# Patient Record
Sex: FEMALE | Race: WHITE | NOT HISPANIC OR LATINO | Employment: OTHER | URBAN - METROPOLITAN AREA
[De-identification: names, ages, dates, MRNs, and addresses within clinical notes are randomized per-mention and may not be internally consistent; named-entity substitution may affect disease eponyms.]

---

## 2017-05-23 ENCOUNTER — COMMUNICATION - HEALTHEAST (OUTPATIENT)
Dept: FAMILY MEDICINE | Facility: CLINIC | Age: 57
End: 2017-05-23

## 2017-05-23 DIAGNOSIS — F32.A DEPRESSION: ICD-10-CM

## 2017-05-25 ENCOUNTER — COMMUNICATION - HEALTHEAST (OUTPATIENT)
Dept: FAMILY MEDICINE | Facility: CLINIC | Age: 57
End: 2017-05-25

## 2017-07-25 ENCOUNTER — COMMUNICATION - HEALTHEAST (OUTPATIENT)
Dept: FAMILY MEDICINE | Facility: CLINIC | Age: 57
End: 2017-07-25

## 2017-07-25 DIAGNOSIS — F32.A DEPRESSION: ICD-10-CM

## 2017-07-30 ENCOUNTER — COMMUNICATION - HEALTHEAST (OUTPATIENT)
Dept: FAMILY MEDICINE | Facility: CLINIC | Age: 57
End: 2017-07-30

## 2017-07-30 DIAGNOSIS — F32.A DEPRESSION: ICD-10-CM

## 2017-07-31 ENCOUNTER — COMMUNICATION - HEALTHEAST (OUTPATIENT)
Dept: FAMILY MEDICINE | Facility: CLINIC | Age: 57
End: 2017-07-31

## 2017-08-07 ENCOUNTER — OFFICE VISIT - HEALTHEAST (OUTPATIENT)
Dept: FAMILY MEDICINE | Facility: CLINIC | Age: 57
End: 2017-08-07

## 2017-08-07 DIAGNOSIS — J32.9 SINUSITIS, CHRONIC: ICD-10-CM

## 2017-08-07 DIAGNOSIS — E66.3 OVERWEIGHT: ICD-10-CM

## 2017-08-07 DIAGNOSIS — Z00.00 HEALTH CARE MAINTENANCE: ICD-10-CM

## 2017-08-07 DIAGNOSIS — F33.1 MAJOR DEPRESSIVE DISORDER, RECURRENT EPISODE, MODERATE (H): ICD-10-CM

## 2017-08-07 DIAGNOSIS — R53.83 FATIGUE: ICD-10-CM

## 2017-08-07 LAB
CHOLEST SERPL-MCNC: 240 MG/DL
FASTING STATUS PATIENT QL REPORTED: NO
HBA1C MFR BLD: 5.9 % (ref 3.5–6)
HDLC SERPL-MCNC: 64 MG/DL
LDLC SERPL CALC-MCNC: 151 MG/DL
TRIGL SERPL-MCNC: 125 MG/DL

## 2017-08-08 ENCOUNTER — COMMUNICATION - HEALTHEAST (OUTPATIENT)
Dept: FAMILY MEDICINE | Facility: CLINIC | Age: 57
End: 2017-08-08

## 2017-08-09 ENCOUNTER — COMMUNICATION - HEALTHEAST (OUTPATIENT)
Dept: FAMILY MEDICINE | Facility: CLINIC | Age: 57
End: 2017-08-09

## 2017-08-21 ENCOUNTER — COMMUNICATION - HEALTHEAST (OUTPATIENT)
Dept: FAMILY MEDICINE | Facility: CLINIC | Age: 57
End: 2017-08-21

## 2017-10-18 ENCOUNTER — COMMUNICATION - HEALTHEAST (OUTPATIENT)
Dept: FAMILY MEDICINE | Facility: CLINIC | Age: 57
End: 2017-10-18

## 2017-10-18 DIAGNOSIS — J32.9 SINUSITIS, CHRONIC: ICD-10-CM

## 2018-03-09 ENCOUNTER — RADIANT APPOINTMENT (OUTPATIENT)
Dept: GENERAL RADIOLOGY | Facility: CLINIC | Age: 58
End: 2018-03-09
Attending: INTERNAL MEDICINE
Payer: COMMERCIAL

## 2018-03-09 ENCOUNTER — OFFICE VISIT (OUTPATIENT)
Dept: URGENT CARE | Facility: URGENT CARE | Age: 58
End: 2018-03-09
Payer: COMMERCIAL

## 2018-03-09 VITALS
OXYGEN SATURATION: 97 % | SYSTOLIC BLOOD PRESSURE: 138 MMHG | TEMPERATURE: 98.8 F | DIASTOLIC BLOOD PRESSURE: 88 MMHG | HEART RATE: 107 BPM

## 2018-03-09 DIAGNOSIS — R05.8 POST-VIRAL COUGH SYNDROME: Primary | ICD-10-CM

## 2018-03-09 DIAGNOSIS — R05.3 CHRONIC COUGH: ICD-10-CM

## 2018-03-09 PROCEDURE — 71046 X-RAY EXAM CHEST 2 VIEWS: CPT

## 2018-03-09 PROCEDURE — 99203 OFFICE O/P NEW LOW 30 MIN: CPT | Performed by: INTERNAL MEDICINE

## 2018-03-09 RX ORDER — ALBUTEROL SULFATE 90 UG/1
2 AEROSOL, METERED RESPIRATORY (INHALATION) EVERY 6 HOURS
COMMUNITY
End: 2022-04-12

## 2018-03-09 RX ORDER — METHYLPREDNISOLONE 4 MG
TABLET, DOSE PACK ORAL
Qty: 21 TABLET | Refills: 0 | Status: SHIPPED | OUTPATIENT
Start: 2018-03-09 | End: 2022-10-17

## 2018-03-09 ASSESSMENT — ENCOUNTER SYMPTOMS
DIAPHORESIS: 0
VOMITING: 0
HEARTBURN: 0
CHILLS: 0
MYALGIAS: 0
NAUSEA: 0
FEVER: 0
SORE THROAT: 0
SHORTNESS OF BREATH: 1
WEIGHT LOSS: 0
COUGH: 1
SPUTUM PRODUCTION: 0
HEMOPTYSIS: 0

## 2018-03-09 NOTE — MR AVS SNAPSHOT
"              After Visit Summary   3/9/2018    Romy LEMA    MRN: [id number]           Patient Information     Date Of Birth          1960        Visit Information        Provider Department      3/9/2018 5:00 PM Chandler Walker MD [facility] [place] [facility]        Today's Diagnoses     Post-viral cough syndrome    -  1    Chronic cough          Care Instructions    Follow up with your doctor if your symptoms persist/worsens, or if you develop new symptoms or side effects from the medication.            Follow-ups after your visit        Who to contact     If you have questions or need follow up information about today's clinic visit or your schedule please contact [facility] directly at [phone].  Normal or non-critical lab and imaging results will be communicated to you by [facility], letter or phone within 4 business days after the clinic has received the results. If you do not hear from us within 7 days, please contact the clinic through MyChart or phone. If you have a critical or abnormal lab result, we will notify you by phone as soon as possible.  Submit refill requests through [employer] or call your pharmacy and they will forward the refill request to us. Please allow 3 business days for your refill to be completed.          Additional Information About Your Visit        MyChart Information     [employer] lets you send messages to your doctor, view your test results, renew your prescriptions, schedule appointments and more. To sign up, go to [email or web address] . Click on \"Log in\" on the left side of the screen, which will take you to the Welcome page. Then click on \"Sign up Now\" on the right side of the page.     You will be asked to enter the access code listed below, as well as some personal information. Please follow the directions to create your username and password.     Your access code is: [email or web address]  Expires: 6/7/2018  6:13 PM     Your " access code will  in 90 days. If you need help or a new code, please call your [place] clinic or [phone].        Care EveryWhere ID     This is your Care EveryWhere ID. This could be used by other organizations to access your [place] medical records  [id number]        Your Vitals Were     Pulse Temperature Pulse Oximetry Breastfeeding?          107 98.8  F (37.1  C) (Tympanic) 97% No         Blood Pressure from Last 3 Encounters:   18 138/88    Weight from Last 3 Encounters:   No data found for Wt                 Today's Medication Changes          These changes are accurate as of 3/9/18  6:13 PM.  If you have any questions, ask your nurse or doctor.               Start taking these medicines.        Dose/Directions    methylPREDNISolone 4 MG tablet   Commonly known as:  MEDROL DOSEPAK   Used for:  Post-viral cough syndrome   Started by:  Chandler Walker MD        Follow package instructions   Quantity:  21 tablet   Refills:  0            Where to get your medicines      These medications were sent to [place] [address] [place] [facility] [address]  [address] [facility] [address]     Phone:  [phone]     methylPREDNISolone 4 MG tablet                Primary Care Provider Fax #    Provider Not In System [phone]                Equal Access to Services     STEPHANIE FORD AH: Hadii ghanshyam ku hadasho Soomaali, waaxda luqadaha, qaybta kaalmada adeegyada, brigido tobar hayveronica lopes. So [facility] [phone].    ATENCIÓN: Si habla español, tiene a simon disposición servicios gratuitos de asistencia lingüística. Llame al [phone].    We comply with applicable federal civil rights laws and [place] laws. We do not discriminate on the basis of race, color, national origin, age, disability, sex, sexual orientation, or gender identity.            Thank you!     Thank you for choosing [facility]  for your care. Our goal is always to provide you with  excellent care. Hearing back from our patients is one way we can continue to improve our services. Please take a few minutes to complete the written survey that you may receive in the mail after your visit with us. Thank you!             Your Updated Medication List - Protect others around you: Learn how to safely use, store and throw away your medicines at www.disposemymeds.org.          This list is accurate as of 3/9/18  6:13 PM.  Always use your most recent med list.                   Brand Name Dispense Instructions for use Diagnosis    albuterol 108 (90 BASE) MCG/ACT Inhaler    PROAIR HFA/PROVENTIL HFA/VENTOLIN HFA     Inhale 2 puffs into the lungs every 6 hours        fluticasone 27.5 MCG/SPRAY spray    VERAMYST     Spray 2 sprays into both nostrils daily        methylPREDNISolone 4 MG tablet    MEDROL DOSEPAK    21 tablet    Follow package instructions    Post-viral cough syndrome       WELLBUTRIN PO

## 2018-03-09 NOTE — NURSING NOTE
Chief Complaint   Patient presents with     Urgent Care     Cough     Patient was seen at [facility] last Friday and they referred her to come here for a chest x-ray.        Initial /88 (BP Location: Right arm, Patient Position: Sitting, Cuff Size: Adult Large)  Pulse 107  Temp 98.8  F (37.1  C) (Tympanic)  SpO2 97%  Breastfeeding? No There is no height or weight on file to calculate BMI.  Medication Reconciliation: complete.  ABHI Stoll

## 2018-03-10 NOTE — PROGRESS NOTES
HPI    During Thanksgiving season of last year, the patient developed fever and URI symptoms which eventually resolved after 1-2 weeks.  Thereafter, the patient continued to have episodic nonproductive cough with occasional shortness of breath. No recurrence of fever or chills. Was seen at a [facility] clinic and was given a prescription for Augmentin; however, her cough continued to persist -- prompting today's consult.      Past medical history: Denies history of asthma      Review of Systems   Constitutional: Negative for chills, diaphoresis, fever, malaise/fatigue and weight loss.   HENT: Negative for congestion and sore throat.    Respiratory: Positive for cough and shortness of breath (occasional). Negative for hemoptysis and sputum production.    Cardiovascular: Positive for chest pain (only post-tussive).   Gastrointestinal: Negative for heartburn, nausea and vomiting.   Musculoskeletal: Negative for myalgias.   Skin: Negative for rash.       /88 (BP Location: Right arm, Patient Position: Sitting, Cuff Size: Adult Large)  Pulse 107  Temp 98.8  F (37.1  C) (Tympanic)  SpO2 97%  Breastfeeding? No      Physical Exam   Constitutional: She is oriented to person, place, and time. No distress.   Cardiovascular: Normal rate, regular rhythm and normal heart sounds.    Pulmonary/Chest: Effort normal and breath sounds normal. No respiratory distress.   Lymphadenopathy:     She has no cervical adenopathy.   Neurological: She is alert and oriented to person, place, and time. GCS score is 15.   Vitals reviewed.        ICD-10-CM    1. Post-viral cough syndrome R05 methylPREDNISolone (MEDROL DOSEPAK) 4 MG tablet   2. Chronic cough R05 XR Chest 2 Views     **please refer to HPI for status of conditions      Patient Instructions   Follow up with your doctor if your symptoms persist/worsens, or if you develop new symptoms or side effects from the medication.

## 2018-03-21 ENCOUNTER — OFFICE VISIT - HEALTHEAST (OUTPATIENT)
Dept: FAMILY MEDICINE | Facility: CLINIC | Age: 58
End: 2018-03-21

## 2018-03-21 DIAGNOSIS — H65.03 BILATERAL ACUTE SEROUS OTITIS MEDIA, RECURRENCE NOT SPECIFIED: ICD-10-CM

## 2018-03-21 DIAGNOSIS — J40 BRONCHITIS: ICD-10-CM

## 2018-03-21 DIAGNOSIS — R05.9 COUGH: ICD-10-CM

## 2018-05-10 ENCOUNTER — OFFICE VISIT - HEALTHEAST (OUTPATIENT)
Dept: FAMILY MEDICINE | Facility: CLINIC | Age: 58
End: 2018-05-10

## 2018-05-10 DIAGNOSIS — M79.10 MYALGIA: ICD-10-CM

## 2018-05-10 DIAGNOSIS — R05.9 COUGH: ICD-10-CM

## 2018-05-10 DIAGNOSIS — R06.2 WHEEZING: ICD-10-CM

## 2018-05-10 LAB
FLUAV AG SPEC QL IA: NORMAL
FLUBV AG SPEC QL IA: NORMAL

## 2018-05-22 ENCOUNTER — COMMUNICATION - HEALTHEAST (OUTPATIENT)
Dept: SCHEDULING | Facility: CLINIC | Age: 58
End: 2018-05-22

## 2018-07-20 ENCOUNTER — COMMUNICATION - HEALTHEAST (OUTPATIENT)
Dept: FAMILY MEDICINE | Facility: CLINIC | Age: 58
End: 2018-07-20

## 2018-07-20 DIAGNOSIS — F33.1 MAJOR DEPRESSIVE DISORDER, RECURRENT EPISODE, MODERATE (H): ICD-10-CM

## 2018-08-02 ENCOUNTER — COMMUNICATION - HEALTHEAST (OUTPATIENT)
Dept: FAMILY MEDICINE | Facility: CLINIC | Age: 58
End: 2018-08-02

## 2018-08-02 DIAGNOSIS — R05.3 CHRONIC COUGH: ICD-10-CM

## 2018-08-02 DIAGNOSIS — R06.2 WHEEZE: ICD-10-CM

## 2018-08-02 DIAGNOSIS — R06.2 WHEEZING: ICD-10-CM

## 2018-08-06 ENCOUNTER — AMBULATORY - HEALTHEAST (OUTPATIENT)
Dept: PULMONOLOGY | Facility: OTHER | Age: 58
End: 2018-08-06

## 2018-08-06 DIAGNOSIS — R05.3 CHRONIC COUGH: ICD-10-CM

## 2018-08-06 DIAGNOSIS — R06.2 WHEEZE: ICD-10-CM

## 2018-08-09 ENCOUNTER — COMMUNICATION - HEALTHEAST (OUTPATIENT)
Dept: PULMONOLOGY | Facility: OTHER | Age: 58
End: 2018-08-09

## 2018-08-30 ENCOUNTER — OFFICE VISIT - HEALTHEAST (OUTPATIENT)
Dept: PULMONOLOGY | Facility: OTHER | Age: 58
End: 2018-08-30

## 2018-08-30 ENCOUNTER — HOSPITAL ENCOUNTER (OUTPATIENT)
Dept: RESPIRATORY THERAPY | Facility: HOSPITAL | Age: 58
Discharge: HOME | End: 2018-08-30
Attending: INTERNAL MEDICINE

## 2018-08-30 DIAGNOSIS — R05.3 CHRONIC COUGH: ICD-10-CM

## 2018-08-30 DIAGNOSIS — R06.2 WHEEZE: ICD-10-CM

## 2018-08-30 DIAGNOSIS — J44.89 CHRONIC OBSTRUCTIVE AIRWAY DISEASE WITH ASTHMA (H): ICD-10-CM

## 2018-08-30 DIAGNOSIS — R06.2 WHEEZING: ICD-10-CM

## 2018-08-30 LAB — HGB BLD-MCNC: 15.6 G/DL (ref 12–16)

## 2018-09-04 LAB — A FUMIGATUS IGE QN: <0.35 KU/L

## 2018-10-24 ENCOUNTER — OFFICE VISIT - HEALTHEAST (OUTPATIENT)
Dept: PULMONOLOGY | Facility: OTHER | Age: 58
End: 2018-10-24

## 2018-10-24 DIAGNOSIS — J45.41 MODERATE PERSISTENT ASTHMA WITH EXACERBATION: ICD-10-CM

## 2018-10-26 ENCOUNTER — COMMUNICATION - HEALTHEAST (OUTPATIENT)
Dept: PULMONOLOGY | Facility: OTHER | Age: 58
End: 2018-10-26

## 2018-10-26 DIAGNOSIS — R06.2 WHEEZING: ICD-10-CM

## 2019-04-16 ENCOUNTER — COMMUNICATION - HEALTHEAST (OUTPATIENT)
Dept: FAMILY MEDICINE | Facility: CLINIC | Age: 59
End: 2019-04-16

## 2019-04-16 DIAGNOSIS — F33.1 MAJOR DEPRESSIVE DISORDER, RECURRENT EPISODE, MODERATE (H): ICD-10-CM

## 2019-05-22 ENCOUNTER — OFFICE VISIT - HEALTHEAST (OUTPATIENT)
Dept: FAMILY MEDICINE | Facility: CLINIC | Age: 59
End: 2019-05-22

## 2019-05-22 DIAGNOSIS — Z12.31 VISIT FOR SCREENING MAMMOGRAM: ICD-10-CM

## 2019-05-22 DIAGNOSIS — F33.1 MAJOR DEPRESSIVE DISORDER, RECURRENT EPISODE, MODERATE (H): ICD-10-CM

## 2019-05-22 DIAGNOSIS — J44.89 CHRONIC OBSTRUCTIVE AIRWAY DISEASE WITH ASTHMA (H): ICD-10-CM

## 2019-05-22 ASSESSMENT — MIFFLIN-ST. JEOR: SCORE: 1543.41

## 2019-06-06 ENCOUNTER — COMMUNICATION - HEALTHEAST (OUTPATIENT)
Dept: FAMILY MEDICINE | Facility: CLINIC | Age: 59
End: 2019-06-06

## 2019-06-06 DIAGNOSIS — J45.30 MILD PERSISTENT ASTHMA WITHOUT COMPLICATION: ICD-10-CM

## 2019-06-07 ENCOUNTER — COMMUNICATION - HEALTHEAST (OUTPATIENT)
Dept: FAMILY MEDICINE | Facility: CLINIC | Age: 59
End: 2019-06-07

## 2019-06-07 DIAGNOSIS — F33.1 MAJOR DEPRESSIVE DISORDER, RECURRENT EPISODE, MODERATE (H): ICD-10-CM

## 2019-10-15 ENCOUNTER — COMMUNICATION - HEALTHEAST (OUTPATIENT)
Dept: PULMONOLOGY | Facility: OTHER | Age: 59
End: 2019-10-15

## 2019-10-15 DIAGNOSIS — J44.89 CHRONIC OBSTRUCTIVE AIRWAY DISEASE WITH ASTHMA (H): ICD-10-CM

## 2019-10-15 DIAGNOSIS — R06.2 WHEEZING: ICD-10-CM

## 2020-02-05 ENCOUNTER — COMMUNICATION - HEALTHEAST (OUTPATIENT)
Dept: FAMILY MEDICINE | Facility: CLINIC | Age: 60
End: 2020-02-05

## 2020-02-05 DIAGNOSIS — J45.30 MILD PERSISTENT ASTHMA WITHOUT COMPLICATION: ICD-10-CM

## 2020-02-05 DIAGNOSIS — R06.2 WHEEZING: ICD-10-CM

## 2020-02-24 ENCOUNTER — COMMUNICATION - HEALTHEAST (OUTPATIENT)
Dept: FAMILY MEDICINE | Facility: CLINIC | Age: 60
End: 2020-02-24

## 2020-02-24 DIAGNOSIS — R06.2 WHEEZING: ICD-10-CM

## 2020-02-28 ENCOUNTER — COMMUNICATION - HEALTHEAST (OUTPATIENT)
Dept: FAMILY MEDICINE | Facility: CLINIC | Age: 60
End: 2020-02-28

## 2020-02-28 DIAGNOSIS — J45.30 MILD PERSISTENT ASTHMA WITHOUT COMPLICATION: ICD-10-CM

## 2020-03-20 ENCOUNTER — COMMUNICATION - HEALTHEAST (OUTPATIENT)
Dept: FAMILY MEDICINE | Facility: CLINIC | Age: 60
End: 2020-03-20

## 2020-03-20 DIAGNOSIS — J45.30 MILD PERSISTENT ASTHMA WITHOUT COMPLICATION: ICD-10-CM

## 2020-03-30 ENCOUNTER — COMMUNICATION - HEALTHEAST (OUTPATIENT)
Dept: PHARMACY | Facility: CLINIC | Age: 60
End: 2020-03-30

## 2020-03-30 DIAGNOSIS — J45.30 MILD PERSISTENT ASTHMA WITHOUT COMPLICATION: ICD-10-CM

## 2020-07-03 ENCOUNTER — COMMUNICATION - HEALTHEAST (OUTPATIENT)
Dept: FAMILY MEDICINE | Facility: CLINIC | Age: 60
End: 2020-07-03

## 2020-07-03 DIAGNOSIS — F33.1 MAJOR DEPRESSIVE DISORDER, RECURRENT EPISODE, MODERATE (H): ICD-10-CM

## 2020-10-09 ENCOUNTER — COMMUNICATION - HEALTHEAST (OUTPATIENT)
Dept: FAMILY MEDICINE | Facility: CLINIC | Age: 60
End: 2020-10-09

## 2020-10-09 DIAGNOSIS — J45.30 MILD PERSISTENT ASTHMA WITHOUT COMPLICATION: ICD-10-CM

## 2020-10-09 DIAGNOSIS — F33.1 MAJOR DEPRESSIVE DISORDER, RECURRENT EPISODE, MODERATE (H): ICD-10-CM

## 2020-10-09 DIAGNOSIS — R06.2 WHEEZING: ICD-10-CM

## 2020-10-13 ENCOUNTER — COMMUNICATION - HEALTHEAST (OUTPATIENT)
Dept: FAMILY MEDICINE | Facility: CLINIC | Age: 60
End: 2020-10-13

## 2020-12-10 ENCOUNTER — OFFICE VISIT - HEALTHEAST (OUTPATIENT)
Dept: FAMILY MEDICINE | Facility: CLINIC | Age: 60
End: 2020-12-10

## 2020-12-10 DIAGNOSIS — J32.9 SINUSITIS, CHRONIC: ICD-10-CM

## 2020-12-10 DIAGNOSIS — R73.03 PREDIABETES: ICD-10-CM

## 2020-12-10 DIAGNOSIS — J45.30 MILD PERSISTENT ASTHMA WITHOUT COMPLICATION: ICD-10-CM

## 2020-12-10 DIAGNOSIS — Z23 NEED FOR INFLUENZA VACCINATION: ICD-10-CM

## 2020-12-10 DIAGNOSIS — Z11.59 ENCOUNTER FOR HEPATITIS C SCREENING TEST FOR LOW RISK PATIENT: ICD-10-CM

## 2020-12-10 DIAGNOSIS — Z12.11 SCREEN FOR COLON CANCER: ICD-10-CM

## 2020-12-10 DIAGNOSIS — Z12.31 VISIT FOR SCREENING MAMMOGRAM: ICD-10-CM

## 2020-12-10 DIAGNOSIS — E78.00 ELEVATED CHOLESTEROL: ICD-10-CM

## 2020-12-10 DIAGNOSIS — F33.1 MAJOR DEPRESSIVE DISORDER, RECURRENT EPISODE, MODERATE (H): ICD-10-CM

## 2020-12-10 LAB
CHOLEST SERPL-MCNC: 258 MG/DL
FASTING STATUS PATIENT QL REPORTED: YES
FASTING STATUS PATIENT QL REPORTED: YES
GLUCOSE BLD-MCNC: 86 MG/DL (ref 70–125)
HBA1C MFR BLD: 5.7 %
HDLC SERPL-MCNC: 77 MG/DL
HGB BLD-MCNC: 14.2 G/DL (ref 12–16)
LDLC SERPL CALC-MCNC: 159 MG/DL
TRIGL SERPL-MCNC: 112 MG/DL

## 2020-12-10 ASSESSMENT — MIFFLIN-ST. JEOR: SCORE: 1657.04

## 2020-12-10 ASSESSMENT — PATIENT HEALTH QUESTIONNAIRE - PHQ9: SUM OF ALL RESPONSES TO PHQ QUESTIONS 1-9: 16

## 2020-12-11 LAB — HCV AB SERPL QL IA: NEGATIVE

## 2020-12-15 ENCOUNTER — HOSPITAL ENCOUNTER (OUTPATIENT)
Dept: MAMMOGRAPHY | Facility: CLINIC | Age: 60
Discharge: HOME | End: 2020-12-15
Attending: FAMILY MEDICINE

## 2020-12-15 DIAGNOSIS — Z12.31 VISIT FOR SCREENING MAMMOGRAM: ICD-10-CM

## 2021-04-20 ENCOUNTER — RECORDS - HEALTHEAST (OUTPATIENT)
Dept: ADMINISTRATIVE | Facility: OTHER | Age: 61
End: 2021-04-20

## 2021-04-22 ENCOUNTER — AMBULATORY - HEALTHEAST (OUTPATIENT)
Dept: NURSING | Facility: CLINIC | Age: 61
End: 2021-04-22

## 2021-05-13 ENCOUNTER — AMBULATORY - HEALTHEAST (OUTPATIENT)
Dept: NURSING | Facility: CLINIC | Age: 61
End: 2021-05-13

## 2021-05-19 ENCOUNTER — OFFICE VISIT - HEALTHEAST (OUTPATIENT)
Dept: FAMILY MEDICINE | Facility: CLINIC | Age: 61
End: 2021-05-19

## 2021-05-19 DIAGNOSIS — Z01.818 PRE-OPERATIVE EXAMINATION: ICD-10-CM

## 2021-05-19 DIAGNOSIS — R73.03 PREDIABETES: ICD-10-CM

## 2021-05-19 DIAGNOSIS — J45.30 MILD PERSISTENT ASTHMA WITHOUT COMPLICATION: ICD-10-CM

## 2021-05-19 DIAGNOSIS — E66.09 CLASS 2 OBESITY DUE TO EXCESS CALORIES WITHOUT SERIOUS COMORBIDITY WITH BODY MASS INDEX (BMI) OF 39.0 TO 39.9 IN ADULT: ICD-10-CM

## 2021-05-19 DIAGNOSIS — Z86.0100 HISTORY OF COLONIC POLYPS: ICD-10-CM

## 2021-05-19 DIAGNOSIS — E66.812 CLASS 2 OBESITY DUE TO EXCESS CALORIES WITHOUT SERIOUS COMORBIDITY WITH BODY MASS INDEX (BMI) OF 39.0 TO 39.9 IN ADULT: ICD-10-CM

## 2021-05-19 DIAGNOSIS — F33.1 MAJOR DEPRESSIVE DISORDER, RECURRENT EPISODE, MODERATE (H): ICD-10-CM

## 2021-05-19 DIAGNOSIS — J32.9 CHRONIC SINUSITIS, UNSPECIFIED LOCATION: ICD-10-CM

## 2021-05-19 LAB
ERYTHROCYTE [DISTWIDTH] IN BLOOD BY AUTOMATED COUNT: 13.7 % (ref 11–14.5)
HBA1C MFR BLD: 5.6 %
HCT VFR BLD AUTO: 42.1 % (ref 35–47)
HGB BLD-MCNC: 14.3 G/DL (ref 12–16)
MCH RBC QN AUTO: 30.4 PG (ref 27–34)
MCHC RBC AUTO-ENTMCNC: 34 G/DL (ref 32–36)
MCV RBC AUTO: 89 FL (ref 80–100)
PLATELET # BLD AUTO: 248 THOU/UL (ref 140–440)
PMV BLD AUTO: 9.1 FL (ref 7–10)
RBC # BLD AUTO: 4.71 MILL/UL (ref 3.8–5.4)
TSH SERPL DL<=0.005 MIU/L-ACNC: 2.7 UIU/ML (ref 0.3–5)
WBC: 8.3 THOU/UL (ref 4–11)

## 2021-05-19 ASSESSMENT — MIFFLIN-ST. JEOR: SCORE: 1625.57

## 2021-05-25 ENCOUNTER — RECORDS - HEALTHEAST (OUTPATIENT)
Dept: ADMINISTRATIVE | Facility: CLINIC | Age: 61
End: 2021-05-25

## 2021-05-26 ASSESSMENT — PATIENT HEALTH QUESTIONNAIRE - PHQ9: SUM OF ALL RESPONSES TO PHQ QUESTIONS 1-9: 16

## 2021-05-27 VITALS
TEMPERATURE: 97.6 F | BODY MASS INDEX: 38.66 KG/M2 | DIASTOLIC BLOOD PRESSURE: 76 MMHG | WEIGHT: 232.06 LBS | SYSTOLIC BLOOD PRESSURE: 118 MMHG | HEART RATE: 89 BPM | HEIGHT: 65 IN | OXYGEN SATURATION: 96 %

## 2021-05-27 NOTE — TELEPHONE ENCOUNTER
RN cannot approve Refill Request    RN can NOT refill this medication PCP messaged that patient is overdue for Office Visit.       Yeni Polk, [facility] Triage/Med Refill 4/18/2019    Requested Prescriptions   Pending Prescriptions Disp Refills     buPROPion (WELLBUTRIN XL) 150 MG 24 hr tablet [Pharmacy Med Name: BUPROPION HCL XL TABS 150MG] 270 tablet 2     Sig: TAKE 3 TABLETS DAILY       Tricyclics/Misc Antidepressant/Antianxiety Meds Refill Protocol Failed - 4/16/2019 11:56 PM        Failed - PCP or prescribing provider visit in last year     Last office visit with prescriber/PCP: 8/7/2017 Rox Traore MD OR same dept: Visit date not found OR same specialty: 5/10/2018 Laura Cullen MD  Last physical: Visit date not found Last MTM visit: Visit date not found   Next visit within 3 mo: Visit date not found  Next physical within 3 mo: Visit date not found  Prescriber OR PCP: Rox Traore MD  Last diagnosis associated with med order: 1. Moderate Recurrent Major Depression  - buPROPion (WELLBUTRIN XL) 150 MG 24 hr tablet [Pharmacy Med Name: BUPROPION HCL XL TABS 150MG]; TAKE 3 TABLETS DAILY  Dispense: 270 tablet; Refill: 2    If protocol passes may refill for 12 months if within 3 months of last provider visit (or a total of 15 months).

## 2021-05-28 ASSESSMENT — ASTHMA QUESTIONNAIRES: ACT_TOTALSCORE: 18

## 2021-05-29 NOTE — TELEPHONE ENCOUNTER
Medication Request  Medication name: Generic Advair  Pharmacy Name and Location: [facility] #[id number]  Reason for request: Patient requesting alternative, Symbicort is too expensive  When did you use medication last?:  Has not used yet  Patient offered appointment:  n/a  Okay to leave a detailed message: yes

## 2021-05-29 NOTE — PROGRESS NOTES
Assessment/Plan:     1. Moderate Recurrent Major Depression  buPROPion (WELLBUTRIN XL) 150 MG 24 hr tablet   2. Chronic obstructive airway disease with asthma (H)         Diagnoses and all orders for this visit:    Moderate Recurrent Major Depression  -     buPROPion (WELLBUTRIN XL) 150 MG 24 hr tablet; TAKE 3 TABLETS DAILY  Dispense: 270 tablet; Refill: 3 she is doing well on current dose of Wellbutrin.  She would like to continue this for now.  We discussed in the future we can try tapering her off of the medication.  Encouraged regular exercise and healthy diet    Chronic obstructive airway disease with asthma (H)  Controlled with current regimen of Symbicort inhaler.  Uses albuterol inhaler rarely    Healthcare maintenance: Recommend she schedule physical with fasting labs.  Discussed she is due for mammogram.  She will check into options with the [employer] program.           The following are part of a depression follow up plan for the patient:  mental health care management    Subjective:      Romy Beltran is a 58 y.o. female who comes in today for medication check and follow-up on depression and asthma.  She has not been seen by myself since August 2017.  Reviewed interim health history.  Last summer she was having trouble with frequent episodes of bronchitis.  Ultimately was evaluated by pulmonology and underwent pulmonary function testing.  Was diagnosed with asthma.  She is now on Symbicort inhaler and using that daily.  She is doing much better.  Rarely uses her rescue inhaler.  She is recovering from a cold and has been using her albuterol inhaler is feeling well.  We reviewed and updated her medications and allergies.  No other significant changes in health.  Uses Flonase as needed.  Continues on Wellbutrin for major depression.  She continues to travel to this area several times a year from her home in [place].  Comes to visit her mother who is now in a memory care facility.  Mother  is doing much better..  Patient's is wondering about whether she could try going off of the medication in the future.  Now that she is retired and is on different health insurance plan, she is more aware of the cost of medications and the Symbicort and Wellbutrin are quite expensive.  She has no other concerns or questions.  We discussed that she is due for mammogram.  States that her insurance does not cover this.  Review of systems is otherwise negative.  No other concerns today.    Current Outpatient Medications   Medication Sig Dispense Refill     albuterol (PROAIR HFA;PROVENTIL HFA;VENTOLIN HFA) 90 mcg/actuation inhaler Inhale 2 puffs every 6 (six) hours as needed for wheezing. 3 each 3     budesonide-formoterol (SYMBICORT) 80-4.5 mcg/actuation inhaler Inhale 2 puffs 2 (two) times a day. 1 Inhaler 12     buPROPion (WELLBUTRIN XL) 150 MG 24 hr tablet TAKE 3 TABLETS DAILY 270 tablet 3     fluticasone (FLONASE) 50 mcg/actuation nasal spray USE 2 SPRAYS IN EACH NOSTRIL DAILY 48 g 2     multivitamin therapeutic (THERAGRAN) tablet Take 1 tablet by mouth daily.       No current facility-administered medications for this visit.        Past Medical History, Family History, and Social History reviewed.  No past medical history on file.  Past Surgical History:   Procedure Laterality Date     [place] LAP,DIAGNOSTIC ABDOMEN      Description: Laparoscopy (Diagnostic);  Recorded: 05/30/2012;     [place] TOTAL ABDOM HYSTERECTOMY      Description: Total Abdominal Hysterectomy;  Recorded: 02/11/2013;     Patient has no known allergies.  Family History   Problem Relation Age of Onset     Vision loss Mother      Diabetes Mother      Arthritis Brother      Hypertension Brother      Cancer Maternal Uncle         eye cancer     Social History     Socioeconomic History     Marital status:      Spouse name: Not on file     Number of children: Not on file     Years of education: Not on file     Highest education level: Not on file  "  Occupational History     Not on file   Social Needs     Financial resource strain: Not on file     Food insecurity:     Worry: Not on file     Inability: Not on file     Transportation needs:     Medical: Not on file     Non-medical: Not on file   Tobacco Use     Smoking status: Former Smoker     Packs/day: 0.50     Years: 7.00     Pack years: 3.50     Last attempt to quit: 1986     Years since quittin.7     Smokeless tobacco: Never Used   Substance and Sexual Activity     Alcohol use: Yes     Alcohol/week: 1.8 oz     Types: 3 Glasses of wine per week     Drug use: No     Sexual activity: Not on file   Lifestyle     Physical activity:     Days per week: Not on file     Minutes per session: Not on file     Stress: Not on file   Relationships     Social connections:     Talks on phone: Not on file     Gets together: Not on file     Attends Yarsanism service: Not on file     Active member of club or organization: Not on file     Attends meetings of clubs or organizations: Not on file     Relationship status: Not on file     Intimate partner violence:     Fear of current or ex partner: Not on file     Emotionally abused: Not on file     Physically abused: Not on file     Forced sexual activity: Not on file   Other Topics Concern     Not on file   Social History Narrative     Not on file         Review of systems is as stated in HPI, and the remainder of the 10 system review is otherwise negative.    Objective:     Vitals:    19 0959   BP: 136/80   Pulse: 86   SpO2: 96%   Weight: 215 lb 11.2 oz (97.8 kg)   Height: 5' 4\" (1.626 m)    Body mass index is 37.02 kg/m .    General appearance: alert, appears stated age and cooperative  Head: Normocephalic, without obvious abnormality, atraumatic  Lungs: clear to auscultation bilaterally  Heart: regular rate and rhythm, S1, S2 normal, no murmur, click, rub or gallop  Neurologic: Grossly normal  Psych: mood appropriate, affect normal    ACT: 20  PHQ-9: " 9      This note has been dictated using voice recognition software. Any grammatical or context distortions are unintentional and inherent to the the software.

## 2021-05-29 NOTE — TELEPHONE ENCOUNTER
Medication Question or Clarification  Who is calling: Pharmacy: [place] [id number]  What medication are you calling about? (include dose and sig) Bupropion  mg, three tablets daily  Who prescribed the medication?: Rox Traore MD   What is your question/concern?: Patient is requesting a 300 mg tablet to take with one 150 mg tablet to reduce her costs.  The Bupropion  mg and  mg tablets are cheaper for a 90 day supply that three tablets daily of the  mg tablet.   Pharmacy: [place] [id number]  Okay to leave a detailed message?: Yes  Site CMT - Please call the pharmacy to obtain any additional needed information.

## 2021-05-31 ENCOUNTER — RECORDS - HEALTHEAST (OUTPATIENT)
Dept: ADMINISTRATIVE | Facility: CLINIC | Age: 61
End: 2021-05-31

## 2021-05-31 VITALS — WEIGHT: 233.06 LBS

## 2021-06-01 VITALS — WEIGHT: 223 LBS

## 2021-06-01 VITALS — WEIGHT: 212 LBS

## 2021-06-01 VITALS — WEIGHT: 218 LBS

## 2021-06-02 VITALS — WEIGHT: 210.1 LBS

## 2021-06-03 VITALS — HEIGHT: 64 IN | WEIGHT: 215.7 LBS | BODY MASS INDEX: 36.82 KG/M2

## 2021-06-05 VITALS
HEART RATE: 82 BPM | SYSTOLIC BLOOD PRESSURE: 120 MMHG | TEMPERATURE: 98.1 F | DIASTOLIC BLOOD PRESSURE: 80 MMHG | HEIGHT: 65 IN | WEIGHT: 239 LBS | OXYGEN SATURATION: 97 % | BODY MASS INDEX: 39.82 KG/M2

## 2021-06-05 NOTE — TELEPHONE ENCOUNTER
Who is calling:  Patient     Reason for Call:  Calling to state : She is having Sx and she is out of Medication. Patient is in town as her mother as passed a way, She would like to request this be expedited .  Thank You    Date of last appointment with primary care:   05/22/19    Okay to leave a detailed message: Yes

## 2021-06-05 NOTE — TELEPHONE ENCOUNTER
Refill Approved    Rx renewed per Medication Renewal Policy. Medication was last renewed on 10/26/18.6/6/19.    Yeni Polk, [facility] Triage/Med Refill 2/5/2020     Requested Prescriptions   Pending Prescriptions Disp Refills     albuterol (PROAIR HFA;PROVENTIL HFA;VENTOLIN HFA) 90 mcg/actuation inhaler 3 each 3     Sig: Inhale 2 puffs every 6 (six) hours as needed for wheezing.       Albuterol/Levalbuterol Refill Protocol Passed - 2/5/2020  3:56 PM        Passed - PCP or prescribing provider visit in last year     Last office visit with prescriber/PCP: 5/22/2019 Rox Traore MD OR same dept: 5/22/2019 Rox Traore MD OR same specialty: 5/22/2019 Rox Traore MD Last physical: Visit date not found       Next appt within 3 mo: Visit date not found  Next physical within 3 mo: Visit date not found  Prescriber OR PCP: Rox Traore MD  Last diagnosis associated with med order: 1. Wheezing  - albuterol (PROAIR HFA;PROVENTIL HFA;VENTOLIN HFA) 90 mcg/actuation inhaler; Inhale 2 puffs every 6 (six) hours as needed for wheezing.  Dispense: 3 each; Refill: 3    2. Mild persistent asthma without complication  - fluticasone propion-salmeterol (ADVAIR DISKUS) 100-50 mcg/dose DISKUS; Inhale 1 puff 2 (two) times a day.  Dispense: 180 puff; Refill: 3    If protocol passes may refill for 6 months if within 3 months of last provider visit (or a total of 9 months). If patient requesting >1 inhaler per month refill x 6 months and have patient make appointment with provider.          fluticasone propion-salmeterol (ADVAIR DISKUS) 100-50 mcg/dose DISKUS 180 puff 3     Sig: Inhale 1 puff 2 (two) times a day.       Asthma Medications Refill Protocol Passed - 2/5/2020  3:56 PM        Passed - PCP or prescribing provider visit in last year     Last office visit with prescriber/PCP: 5/22/2019 Rox Traore MD OR same dept: 5/22/2019 Rox Traore MD OR same specialty: 5/22/2019 Rox Traore MD  Last physical: Visit date  not found Last MTM visit: Visit date not found    Next appt within 3 mo: Visit date not found Next physical within 3 mo: Visit date not found  Prescriber OR PCP: Rox Traore MD  Last diagnosis associated with med order: 1. Wheezing  - albuterol (PROAIR HFA;PROVENTIL HFA;VENTOLIN HFA) 90 mcg/actuation inhaler; Inhale 2 puffs every 6 (six) hours as needed for wheezing.  Dispense: 3 each; Refill: 3    2. Mild persistent asthma without complication  - fluticasone propion-salmeterol (ADVAIR DISKUS) 100-50 mcg/dose DISKUS; Inhale 1 puff 2 (two) times a day.  Dispense: 180 puff; Refill: 3    If protocol passes may refill for 6 months if within 3 months of last provider visit (or a total of 9 months).

## 2021-06-06 NOTE — TELEPHONE ENCOUNTER
RN cannot approve Refill Request    RN can NOT refill this medication med is not covered by policy/route to provider. Last office visit: 5/22/2019 Rox Traore MD Last Physical: Visit date not found Last MTM visit: Visit date not found Last visit same specialty: 5/22/2019 Rox Traore MD.  Next visit within 3 mo: Visit date not found  Next physical within 3 mo: Visit date not found      Paula Tai, [facility] Triage/Med Refill 2/28/2020    Requested Prescriptions   Pending Prescriptions Disp Refills     fluticasone propion-salmeteroL (ADVAIR) 100-50 mcg/dose DISKUS [Pharmacy Med Name: WIXELA 100-50 INHUB] 60 puff 0     Sig: TAKE 1 PUFF BY MOUTH TWICE A DAY       There is no refill protocol information for this order

## 2021-06-07 NOTE — TELEPHONE ENCOUNTER
Received 90 day request from [facility] for Wixela. Patient last seen May 2019 therefore will send 90 day supply no refills then patient is due for follow up.

## 2021-06-09 NOTE — TELEPHONE ENCOUNTER
RN cannot approve Refill Request    RN can NOT refill this medication Protocol failed and NO refill given.       Yeni Polk, [facility] Connection Triage/Med Refill 7/3/2020    Requested Prescriptions   Pending Prescriptions Disp Refills     buPROPion (WELLBUTRIN XL) 150 MG 24 hr tablet [Pharmacy Med Name: BUPROPION HCL  MG TABLET] 90 tablet 3     Sig: TAKE 1 TAB BY MOUTH DAILY. TAKE IN ADDITION TO 300MG FOR TOTAL DAILY DOSE OF 450MG       Tricyclics/Misc Antidepressant/Antianxiety Meds Refill Protocol Failed - 7/3/2020 12:13 AM        Failed - PCP or prescribing provider visit in last year     Last office visit with prescriber/PCP: Visit date not found OR same dept: Visit date not found OR same specialty: 5/22/2019 Rox Traore MD  Last physical: Visit date not found Last MTM visit: Visit date not found   Next visit within 3 mo: Visit date not found  Next physical within 3 mo: Visit date not found  Prescriber OR PCP: Kellee Eric MD  Last diagnosis associated with med order: 1. Moderate Recurrent Major Depression  - buPROPion (WELLBUTRIN XL) 150 MG 24 hr tablet [Pharmacy Med Name: BUPROPION HCL  MG TABLET]; TAKE 1 TAB BY MOUTH DAILY. TAKE IN ADDITION TO 300MG FOR TOTAL DAILY DOSE OF 450MG  Dispense: 90 tablet; Refill: 3  - buPROPion (WELLBUTRIN XL) 300 MG 24 hr tablet [Pharmacy Med Name: BUPROPION HCL  MG TABLET]; TAKE 1 TAB BY MOUTH DAILY IN THE AM. TAKE IN ADDITION TO 150MG FOR TOTAL DAILY DOSE OF 450MG  Dispense: 90 tablet; Refill: 3    If protocol passes may refill for 12 months if within 3 months of last provider visit (or a total of 15 months).                buPROPion (WELLBUTRIN XL) 300 MG 24 hr tablet [Pharmacy Med Name: BUPROPION HCL  MG TABLET] 90 tablet 3     Sig: TAKE 1 TAB BY MOUTH DAILY IN THE AM. TAKE IN ADDITION TO 150MG FOR TOTAL DAILY DOSE OF 450MG       Tricyclics/Misc Antidepressant/Antianxiety Meds Refill Protocol Failed - 7/3/2020 12:13 AM        Failed - PCP or  prescribing provider visit in last year     Last office visit with prescriber/PCP: Visit date not found OR same dept: Visit date not found OR same specialty: 5/22/2019 Rox Traore MD  Last physical: Visit date not found Last MTM visit: Visit date not found   Next visit within 3 mo: Visit date not found  Next physical within 3 mo: Visit date not found  Prescriber OR PCP: Kellee Eric MD  Last diagnosis associated with med order: 1. Moderate Recurrent Major Depression  - buPROPion (WELLBUTRIN XL) 150 MG 24 hr tablet [Pharmacy Med Name: BUPROPION HCL  MG TABLET]; TAKE 1 TAB BY MOUTH DAILY. TAKE IN ADDITION TO 300MG FOR TOTAL DAILY DOSE OF 450MG  Dispense: 90 tablet; Refill: 3  - buPROPion (WELLBUTRIN XL) 300 MG 24 hr tablet [Pharmacy Med Name: BUPROPION HCL  MG TABLET]; TAKE 1 TAB BY MOUTH DAILY IN THE AM. TAKE IN ADDITION TO 150MG FOR TOTAL DAILY DOSE OF 450MG  Dispense: 90 tablet; Refill: 3    If protocol passes may refill for 12 months if within 3 months of last provider visit (or a total of 15 months).

## 2021-06-12 NOTE — TELEPHONE ENCOUNTER
RN cannot approve Refill Request    RN can NOT refill this medication Protocol failed and NO refill given. Last office visit: 5/22/2019 Rox Traore MD Last Physical: Visit date not found Last MTM visit: Visit date not found Last visit same specialty: 5/22/2019 Rox Traore MD.  Next visit within 3 mo: Visit date not found  Next physical within 3 mo: Visit date not found      Yeni Polk, [facility] Triage/Med Refill 10/10/2020    Requested Prescriptions   Pending Prescriptions Disp Refills     buPROPion (WELLBUTRIN XL) 150 MG 24 hr tablet 90 tablet 0       Tricyclics/Misc Antidepressant/Antianxiety Meds Refill Protocol Failed - 10/9/2020  3:23 PM        Failed - PCP or prescribing provider visit in last year     Last office visit with prescriber/PCP: 5/22/2019 Rox Traore MD OR same dept: Visit date not found OR same specialty: 5/22/2019 Rox Traore MD  Last physical: Visit date not found Last MTM visit: Visit date not found   Next visit within 3 mo: Visit date not found  Next physical within 3 mo: Visit date not found  Prescriber OR PCP: Rox Traore MD  Last diagnosis associated with med order: 1. Moderate Recurrent Major Depression  - buPROPion (WELLBUTRIN XL) 150 MG 24 hr tablet  Dispense: 90 tablet; Refill: 0  - buPROPion (WELLBUTRIN XL) 300 MG 24 hr tablet  Dispense: 90 tablet; Refill: 0    2. Wheezing  - albuterol (PROAIR HFA;PROVENTIL HFA;VENTOLIN HFA) 90 mcg/actuation inhaler  Dispense: 8.5 Inhaler; Refill: 1    3. Mild persistent asthma without complication  - fluticasone propion-salmeteroL (ADVAIR) 100-50 mcg/dose DISKUS; TAKE 1 PUFF BY MOUTH TWICE A DAY  Dispense: 180 each; Refill: 0    If protocol passes may refill for 12 months if within 3 months of last provider visit (or a total of 15 months).                buPROPion (WELLBUTRIN XL) 300 MG 24 hr tablet 90 tablet 0       Tricyclics/Misc Antidepressant/Antianxiety Meds Refill Protocol Failed - 10/9/2020  3:23 PM        Failed - PCP  or prescribing provider visit in last year     Last office visit with prescriber/PCP: 5/22/2019 Rox Traore MD OR [facility] dept: Visit date not found OR same specialty: 5/22/2019 Rox Traore MD  Last physical: Visit date not found Last MTM visit: Visit date not found   Next visit within 3 mo: Visit date not found  Next physical within 3 mo: Visit date not found  Prescriber OR PCP: Rox Traore MD  Last diagnosis associated with med order: 1. Moderate Recurrent Major Depression  - buPROPion (WELLBUTRIN XL) 150 MG 24 hr tablet  Dispense: 90 tablet; Refill: 0  - buPROPion (WELLBUTRIN XL) 300 MG 24 hr tablet  Dispense: 90 tablet; Refill: 0    2. Wheezing  - albuterol (PROAIR HFA;PROVENTIL HFA;VENTOLIN HFA) 90 mcg/actuation inhaler  Dispense: 8.5 Inhaler; Refill: 1    3. Mild persistent asthma without complication  - fluticasone propion-salmeteroL (ADVAIR) 100-50 mcg/dose DISKUS; TAKE 1 PUFF BY MOUTH TWICE A DAY  Dispense: 180 each; Refill: 0    If protocol passes may refill for 12 months if within 3 months of last provider visit (or a total of 15 months).                albuterol (PROAIR HFA;PROVENTIL HFA;VENTOLIN HFA) 90 mcg/actuation inhaler 8.5 Inhaler 1       Albuterol/Levalbuterol Refill Protocol Failed - 10/9/2020  3:23 PM        Failed - PCP or prescribing provider visit in last year     Last office visit with prescriber/PCP: 5/22/2019 Rox Traore MD OR [facility] dept: Visit date not found OR same specialty: 5/22/2019 Rox Traore MD Last physical: Visit date not found       Next appt within 3 mo: Visit date not found  Next physical within 3 mo: Visit date not found  Prescriber OR PCP: Rox Traore MD  Last diagnosis associated with med order: 1. Moderate Recurrent Major Depression  - buPROPion (WELLBUTRIN XL) 150 MG 24 hr tablet  Dispense: 90 tablet; Refill: 0  - buPROPion (WELLBUTRIN XL) 300 MG 24 hr tablet  Dispense: 90 tablet; Refill: 0    2. Wheezing  - albuterol (PROAIR HFA;PROVENTIL HFA;VENTOLIN HFA)  90 mcg/actuation inhaler  Dispense: 8.5 Inhaler; Refill: 1    3. Mild persistent asthma without complication  - fluticasone propion-salmeteroL (ADVAIR) 100-50 mcg/dose DISKUS; TAKE 1 PUFF BY MOUTH TWICE A DAY  Dispense: 180 each; Refill: 0    If protocol passes may refill for 6 months if within 3 months of last provider visit (or a total of 9 months). If patient requesting >1 inhaler per month refill x 6 months and have patient make appointment with provider.             fluticasone propion-salmeteroL (ADVAIR) 100-50 mcg/dose DISKUS 180 each 0     Sig: TAKE 1 PUFF BY MOUTH TWICE A DAY       There is no refill protocol information for this order

## 2021-06-12 NOTE — PROGRESS NOTES
Assessment/Plan:     1. Moderate Recurrent Major Depression  buPROPion (WELLBUTRIN XL) 150 MG 24 hr tablet   2. Fatigue  Comprehensive Metabolic Panel    HM2(CBC w/o Differential)    Vitamin B12    Vitamin D, Total (25-Hydroxy)    Thyroid Stimulating Hormone (TSH)    Ferritin    Iron and Transferrin Iron Binding Capacity   3. Health care maintenance  Lipid Cascade FASTING   4. Overweight  Glycosylated Hemoglobin A1C   5. Sinusitis, chronic         Diagnoses and all orders for this visit:    Moderate Recurrent Major Depression  -     buPROPion (WELLBUTRIN XL) 150 MG 24 hr tablet; Take 3 tablets (450 mg total) by mouth daily.  Dispense: 270 tablet; Refill: 3  -Feels like she is doing well on current regimen.  Will continue Wellbutrin  mg daily.  Refill provided.  Encouraged regular exercise and healthy diet  -Check TSH and vitamin D today.    Fatigue  -     Comprehensive Metabolic Panel  -     HM2(CBC w/o Differential)  -     Vitamin B12  -     Vitamin D, Total (25-Hydroxy)  -     Thyroid Stimulating Hormone (TSH)  -     Ferritin  -     Iron and Transferrin Iron Binding Capacity  -Encouraged regular exercise and healthy diet  -Consider sleep consult if lab testing is unremarkable for evaluation of sleep apnea    Health care maintenance  -     Lipid [place] FASTING  -She was given a card to schedule mammogram.  -No longer needs Pap smears due to history of hysterectomy.  -Colonoscopy up-to-date.    Overweight  -     Glycosylated Hemoglobin A1C  -Check TSH  -Lipid cascade  -Encouraged regular exercise, healthy diet and weight loss    Sinusitis, chronic  -Continue fluticasone nasal spray and Claritin-D.  Suggest nasal saline spray in addition to these medications.     Other orders  -     fluticasone (FLONASE) 50 mcg/actuation nasal spray; 2 sprays into each nostril daily.  Dispense: 48 g; Refill: 0             The following high BMI interventions were performed this visit: encouragement to  exercise    Subjective:      Romy Beltran is a 57 y.o. female who comes in today for medication check and follow-up on depression.  She has not been seen in clinic in early 2 years.  Reviewed her health history.  No significant changes.  She has history of recurrent major depression.  Currently on Wellbutrin 450 mg daily.  Has been on this medication for many years.  Reports that it continues to work well for her.  She has retired from her job as an .  Retired in March of this year.  Has now moved to [facility] but unfortunately her mother was in a serious car accident so she is spending time locally to help care for her mother and plans to be in the area for the next several months.  Since residential her schedule has been a little bit irregular and she has occasionally forgotten to take the medication for a few days in a row.  She reports that she will notice worsening mood symptoms and increased irritability so therefore she feels that the medication is working well.  Does not desire any adjustments in dosage at this time.  Eventually would like to try to decrease the dosage but does not feel that she is ready for that at this time.  She also has chronic sinusitis.  Uses fluticasone nasal spray and Claritin-D.  She is also taking a multivitamin.  Reviewed allergies and medications.  Medication reconciliation performed.  On review of systems she reports a lot of fatigue.  Feels that she is very tired even though she gets adequate rest.  She admits that she does toss and turn at night.  Feels this is related to her sinuses.  She also snores.  Has not been evaluated for sleep apnea.  She does not notice any dizziness or lightheadedness.  No chest pain or dyspnea.  No lower extremity edema.  Review of systems is otherwise negative.  She reports that she has gained weight since she retired and has been more sedentary but she is making an effort to eat healthier and to exercise more.   No other concerns today.    Current Outpatient Prescriptions   Medication Sig Dispense Refill     buPROPion (WELLBUTRIN XL) 150 MG 24 hr tablet Take 3 tablets (450 mg total) by mouth daily. 270 tablet 3     cholecalciferol, vitamin D3, 1,000 unit tablet Take 1,000 Units by mouth daily.       fluticasone (FLONASE) 50 mcg/actuation nasal spray 2 sprays into each nostril daily. 48 g 0     multivitamin therapeutic (THERAGRAN) tablet Take 1 tablet by mouth daily.       loratadine-pseudoephedrine (CLARITIN-D 24-HOUR)  mg per 24 hr tablet Take 1 tablet by mouth daily. Prn       No current facility-administered medications for this visit.        Past Medical History, Family History, and Social History reviewed.  No past medical history on file.  Past Surgical History:   Procedure Laterality Date     SD LAP,DIAGNOSTIC ABDOMEN      Description: Laparoscopy (Diagnostic);  Recorded: 05/30/2012;     SD TOTAL ABDOM HYSTERECTOMY      Description: Total Abdominal Hysterectomy;  Recorded: 02/11/2013;     Review of patient's allergies indicates no known allergies.  Family History   Problem Relation Age of Onset     Vision loss Mother      Diabetes Mother      Arthritis Brother      Hypertension Brother      Cancer Maternal Uncle      eye cancer     Social History     Social History     Marital status:      Spouse name: N/A     Number of children: N/A     Years of education: N/A     Occupational History     Not on file.     Social History Main Topics     Smoking status: Never Smoker     Smokeless tobacco: Never Used     Alcohol use 1.8 oz/week     3 Glasses of wine per week     Drug use: No     Sexual activity: Not on file     Other Topics Concern     Not on file     Social History Narrative         Review of systems is as stated in HPI, and the remainder of the 10 system review is otherwise negative.    Objective:     Vitals:    08/07/17 1354   BP: 130/84   Patient Site: Right Arm   Patient Position: Sitting   Cuff Size:  Adult Large   Pulse: 96   Temp: 97.9  F (36.6  C)   TempSrc: Tympanic   SpO2: 96%   Weight: (!) 233 lb 1 oz (105.7 kg)    There is no height or weight on file to calculate BMI.    General appearance: alert, appears stated age and cooperative  Head: Normocephalic, without obvious abnormality, atraumatic  Eyes: conjunctivae/corneas clear. PERRL, EOM's intact. Fundi benign.  Neck: no adenopathy, supple, symmetrical, trachea midline and thyroid not enlarged, symmetric, no tenderness/mass/nodules  Lungs: clear to auscultation bilaterally  Heart: regular rate and rhythm, S1, S2 normal, no murmur, click, rub or gallop  Extremities: extremities normal, atraumatic, no cyanosis or edema  Neurologic: Grossly normal  Psych: mood appropriate, affect normal    Results: PHQ-9: 13   KYRA-7: 5    This note has been dictated using voice recognition software. Any grammatical or context distortions are unintentional and inherent to the the software.

## 2021-06-13 NOTE — PROGRESS NOTES
Assessment/Plan:     1. Mild persistent asthma without complication  albuterol (PROAIR HFA;PROVENTIL HFA;VENTOLIN HFA) 90 mcg/actuation inhaler    fluticasone propion-salmeteroL (ADVAIR) 100-50 mcg/dose DISKUS   2. Encounter for hepatitis C screening test for low risk patient  Hepatitis C Antibody (Anti-HCV)   3. Elevated cholesterol  Lipid Cascade   4. Visit for screening mammogram  Mammo Screening Bilateral   5. Screen for colon cancer  Ambulatory referral for Colonoscopy   6. Sinusitis, chronic  fluticasone propionate (FLONASE) 50 mcg/actuation nasal spray   7. Moderate Recurrent Major Depression  buPROPion (WELLBUTRIN XL) 300 MG 24 hr tablet    buPROPion (WELLBUTRIN XL) 150 MG 24 hr tablet   8. Need for influenza vaccination  Influenza, Recombinant, Inj, Quadrivalent, PF, 18+YRS   9. Prediabetes  Glycosylated Hemoglobin A1c    Glucose    Hemoglobin       Diagnoses and all orders for this visit:    Mild persistent asthma without complication  -     albuterol (PROAIR HFA;PROVENTIL HFA;VENTOLIN HFA) 90 mcg/actuation inhaler; TAKE 2 PUFFS BY MOUTH EVERY 6 HOURS AS NEEDED FOR WHEEZE  Dispense: 8.5 Inhaler; Refill: 3  -     fluticasone propion-salmeteroL (ADVAIR) 100-50 mcg/dose DISKUS; TAKE 1 PUFF BY MOUTH TWICE A DAY  Dispense: 180 each; Refill: 3  -Doing well on current regimen.  Asthma action plan completed.  Refills provided.  Pneumovax and influenza vaccines administered today.  Counseled on vaccines and side effects.    Encounter for hepatitis C screening test for low risk patient  -     Hepatitis C Antibody (Anti-HCV)    Elevated cholesterol  -     Lipid Cascade  -Encouraged regular exercise and healthy diet    Visit for screening mammogram  -     Mammo Screening Bilateral; Future; Expected date: 12/10/2020    Screen for colon cancer  -     Ambulatory referral for Colonoscopy    Sinusitis, chronic  -     fluticasone propionate (FLONASE) 50 mcg/actuation nasal spray; Apply 2 sprays into each nostril daily.   Dispense: 48 g; Refill: 3  Does she also uses Mucinex and Claritin as needed    Moderate Recurrent Major Depression  -     buPROPion (WELLBUTRIN XL) 300 MG 24 hr tablet; TAKE 1 TAB BY MOUTH DAILY IN THE AM. TAKE IN ADDITION TO 150MG FOR TOTAL DAILY DOSE OF 450MG  Dispense: 90 tablet; Refill: 3  -     buPROPion (WELLBUTRIN XL) 150 MG 24 hr tablet; TAKE 1 TAB BY MOUTH DAILY. TAKE IN ADDITION TO 300MG FOR TOTAL DAILY DOSE OF 450MG  Dispense: 90 tablet; Refill: 3  -Doing well on current regimen.  Refills provided    Need for influenza vaccination  -     Influenza, Recombinant, Inj, Quadrivalent, PF, 18+YRS    Prediabetes  -     Glycosylated Hemoglobin A1c  -     Glucose  -     Hemoglobin  -Encourage regular exercise and healthy diet                   I have had an Advance Directives discussion with the patient.  The following are part of a depression follow up plan for the patient:  mental health care management  The following high BMI interventions were performed this visit: encouragement to exercise    Subjective:      Romy Beltran is a 60 y.o. female who is seen today for medication check and follow-up visit.  She has not been seen since May 2019.  She reports no significant changes in her health since that time.  She lives in [place] but has continued to spend quite a bit of time in this area.  Her mother was living in a memory care facility.  Unfortunately, she reports that her mother passed away just before the Covid pandemic began.  She has been busy tying up her mother's estate.  She does have history of asthma.  She is on Advair inhaler twice daily.  She uses albuterol as needed.  States that she was having some trouble with her asthma flaring up in August and September.  She got new inhalers from the pharmacy and she reports that since then her asthma has been under good control.  She has history of chronic sinusitis.  She uses Mucinex and Claritin as needed for head and sinus congestion.  She also has  fluticasone nasal spray.  She has history of depression.  Continues on Wellbutrin.  Feels like current dose is working well.  In the past, we had discussed possibly trying to decrease the dose of this medication but she would like to continue on with the current dose at this time.  She is due for some preventive care.  She is in need of a mammogram and colonoscopy.  She has not yet had influenza vaccine.  She is due for some labs as she does have history of prediabetes.  Medications and allergies are reviewed and updated.  She has no other concerns or questions today.    Current Outpatient Medications   Medication Sig Dispense Refill     albuterol (PROAIR HFA;PROVENTIL HFA;VENTOLIN HFA) 90 mcg/actuation inhaler TAKE 2 PUFFS BY MOUTH EVERY 6 HOURS AS NEEDED FOR WHEEZE 8.5 Inhaler 3     buPROPion (WELLBUTRIN XL) 150 MG 24 hr tablet TAKE 1 TAB BY MOUTH DAILY. TAKE IN ADDITION TO 300MG FOR TOTAL DAILY DOSE OF 450MG 90 tablet 3     buPROPion (WELLBUTRIN XL) 300 MG 24 hr tablet TAKE 1 TAB BY MOUTH DAILY IN THE AM. TAKE IN ADDITION TO 150MG FOR TOTAL DAILY DOSE OF 450MG 90 tablet 3     fluticasone propion-salmeteroL (ADVAIR) 100-50 mcg/dose DISKUS TAKE 1 PUFF BY MOUTH TWICE A  each 3     fluticasone propionate (FLONASE) 50 mcg/actuation nasal spray Apply 2 sprays into each nostril daily. 48 g 3     No current facility-administered medications for this visit.        Past Medical History, Family History, and Social History reviewed.  No past medical history on file.  Past Surgical History:   Procedure Laterality Date     IN LAP,DIAGNOSTIC ABDOMEN      Description: Laparoscopy (Diagnostic);  Recorded: 05/30/2012;     IN TOTAL ABDOM HYSTERECTOMY      Description: Total Abdominal Hysterectomy;  Recorded: 02/11/2013;     Patient has no known allergies.  Family History   Problem Relation Age of Onset     Vision loss Mother      Diabetes Mother      Arthritis Brother      Hypertension Brother      Cancer Maternal Uncle       "   eye cancer     Social History     Socioeconomic History     Marital status:      Spouse name: Not on file     Number of children: Not on file     Years of education: Not on file     Highest education level: Not on file   Occupational History     Not on file   Social Needs     Financial resource strain: Not on file     Food insecurity     Worry: Not on file     Inability: Not on file     Transportation needs     Medical: Not on file     Non-medical: Not on file   Tobacco Use     Smoking status: Former Smoker     Packs/day: 0.50     Years: 7.00     Pack years: 3.50     Quit date: 1986     Years since quittin.3     Smokeless tobacco: Never Used   Substance and Sexual Activity     Alcohol use: Yes     Alcohol/week: 3.0 standard drinks     Types: 3 Glasses of wine per week     Drug use: No     Sexual activity: Not on file   Lifestyle     Physical activity     Days per week: Not on file     Minutes per session: Not on file     Stress: Not on file   Relationships     Social connections     Talks on phone: Not on file     Gets together: Not on file     Attends [employer] service: Not on file     Active member of club or organization: Not on file     Attends meetings of clubs or organizations: Not on file     Relationship status: Not on file     Intimate partner violence     Fear of current or ex partner: Not on file     Emotionally abused: Not on file     Physically abused: Not on file     Forced sexual activity: Not on file   Other Topics Concern     Not on file   Social History Narrative     Not on file         Review of systems is as stated in HPI, and the remainder of the 10 system review is otherwise negative.    Objective:     Vitals:    12/10/20 0955   BP: 120/80   Patient Site: Left Arm   Patient Position: Sitting   Cuff Size: Adult Large   Pulse: 82   Temp: 98.1  F (36.7  C)   TempSrc: Temporal   SpO2: 97%   Weight: (!) 239 lb (108.4 kg)   Height: 5' 4.5\" (1.638 m)    Body mass index is 40.39 " kg/m .    General appearance: alert, appears stated age and cooperative  Head: Normocephalic, without obvious abnormality, atraumatic  Lungs: clear to auscultation bilaterally  Heart: regular rate and rhythm, S1, S2 normal, no murmur, click, rub or gallop  Extremities: extremities normal, atraumatic, no cyanosis or edema  Neurologic: Grossly normal          This note has been dictated using voice recognition software. Any grammatical or context distortions are unintentional and inherent to the the software.

## 2021-06-16 PROBLEM — J45.30 MILD PERSISTENT ASTHMA WITHOUT COMPLICATION: Status: ACTIVE | Noted: 2020-12-10

## 2021-06-16 PROBLEM — E66.01 MORBID OBESITY (H): Status: ACTIVE | Noted: 2020-12-10

## 2021-06-16 NOTE — PROGRESS NOTES
Family Medicine Office Visit  [facility]  Patient Name: Romy Beltran  Patient Age: 57 y.o.  YOB: 1960  MRN: [id number]    Date of Visit: 3/21/2018  Reason for Office Visit:   Chief Complaint   Patient presents with     Follow-up     [facility]. Was sent to Urgent care at [place] and  chest x-ray was done. Was given methylprednisone. Has finised that. She is feeling better. Still has wheezing and a cough.            Assessment / Plan / Medical Decision Makin. Bronchitis  - cefTRIAXone injection 1 g (ROCEPHIN); Inject 1 g into the shoulder, thigh, or buttocks daily.  - given script of levaquin, medrol dose pack and tessalon pereles, if no improvement then call the office.          Health Maintenance Review  Health Maintenance   Topic Date Due     MAMMOGRAM  1960     ADVANCE DIRECTIVES DISCUSSED WITH PATIENT  1980     INFLUENZA VACCINE RULE BASED (1) 2017     DEPRESSION FOLLOW UP  2018     TD 18+ HE  2022     COLONOSCOPY  2025     TDAP ADULT ONE TIME DOSE  Completed         I am having Ms. Beltran start on levoFLOXacin, methylPREDNISolone, and benzonatate. I am also having her maintain her loratadine-pseudoephedrine, multivitamin therapeutic, cholecalciferol (vitamin D3), buPROPion, and fluticasone. We administered cefTRIAXone. We will continue to administer cefTRIAXone.      HPI:  Romy Beltran is a 57 y.o. year old who presents to the office today for follow up to the [facility] clinic.  States seen initially early March and given antibiotic and then returned on 3/9/18 to the clinic and given steroids and CXR done and reviewed.  Reviewed notes today.  Felt better with the steroid and coughing calmed down and then this past Saturday coughing started up again.  Started with sore throat and then congestion, ear pain bilaterally, and then coughing with green sputum production.  Unknown if any  fevers or chills.  Given inhaler that initially used very regularly and then stopped using it and now just starting back with it.        Review of Systems- pertinent positive in bold:  Constitutional: Fever, chills, night sweats, fainting, weight change, fatigue, seizures, dizziness, sleeping difficulties, loud snoring/pauses in breathing  Eyes: change in vision, blurred or double vision, redness/eye pain  Ears, nose, mouth, throat: change in hearing, ear pain, hoarseness, difficulty swallowing, sores in the mouth or throat  Respiratory: shortness of breath, cough, bloody sputum, wheezing  Cardiovascular: chest pain, palpitations   Gastrointestinal: abdominal pain, heartburn/indigestion, nausea/vomiting, change in appetite, change in bowel habits, constipation or diarrhea, rectal bleeding/dark stools, difficulty swallowing  Urinary: painful urination, frequent urination, urinary urgency/incontinence, blood in urine/dark urine, nocturia  Musculoskeletal: backache/back pain (new or increasing), weakness, joint pain/stiffness (new or increasing), muscle cramps, swelling of hands, feet, ankles, leg pain/redness  Skin: change in moles/freckles, rash, nodules  Hematologic/lymphatic: swollen lymph glands, abnormal bruising/bleeding  Endocrine: excessive thirst/urination, cold or heat intolerance  Neurologic/emotional: worrisome memory change, numbness/tingling, anxiety, mood swings      Current Scheduled Meds:  Outpatient Encounter Prescriptions as of 3/21/2018   Medication Sig Dispense Refill     buPROPion (WELLBUTRIN XL) 150 MG 24 hr tablet Take 3 tablets (450 mg total) by mouth daily. 270 tablet 3     fluticasone (FLONASE) 50 mcg/actuation nasal spray USE 2 SPRAYS IN EACH NOSTRIL DAILY 48 g 2     multivitamin therapeutic (THERAGRAN) tablet Take 1 tablet by mouth daily.       benzonatate (TESSALON PERLES) 100 MG capsule Take 1 capsule (100 mg total) by mouth 3 (three) times a day as needed for cough. 30 capsule 0      cholecalciferol, vitamin D3, 1,000 unit tablet Take 1,000 Units by mouth daily.       levoFLOXacin (LEVAQUIN) 500 MG tablet Take 1 tablet (500 mg total) by mouth daily for 10 days. 10 tablet 0     loratadine-pseudoephedrine (CLARITIN-D 24-HOUR)  mg per 24 hr tablet Take 1 tablet by mouth daily. Prn       methylPREDNISolone (MEDROL DOSEPACK) 4 mg tablet Take 1 tablet (4 mg total) by mouth daily. follow package directions 42 tablet 0     Facility-Administered Encounter Medications as of 3/21/2018   Medication Dose Route Frequency Provider Last Rate Last Dose     [START ON 3/22/2018] cefTRIAXone injection 1 g (ROCEPHIN)  1 g Intramuscular Q24H Laura Cullen MD         [DISCONTINUED] cefTRIAXone injection 1 g (ROCEPHIN)  1 g Intramuscular Q24H Laura Cullen MD   1 g at 03/21/18 1133     No past medical history on file.  Past Surgical History:   Procedure Laterality Date     [place] LAP,DIAGNOSTIC ABDOMEN      Description: Laparoscopy (Diagnostic);  Recorded: 05/30/2012;     [place] TOTAL ABDOM HYSTERECTOMY      Description: Total Abdominal Hysterectomy;  Recorded: 02/11/2013;     Social History   Substance Use Topics     Smoking status: Never Smoker     Smokeless tobacco: Never Used     Alcohol use 1.8 oz/week     3 Glasses of wine per week       Objective / Physical Examination:  Vitals:    03/21/18 1102   BP: 122/86   Pulse: (!) 105   Resp: 20   SpO2: 94%   Weight: 218 lb (98.9 kg)     Wt Readings from Last 3 Encounters:   03/21/18 218 lb (98.9 kg)   08/07/17 (!) 233 lb 1 oz (105.7 kg)   08/27/15 218 lb 5 oz (99 kg)     BP Readings from Last 3 Encounters:   03/21/18 122/86   08/07/17 130/84   08/27/15 118/76     There is no height or weight on file to calculate BMI.     General Appearance: Alert and oriented, cooperative, affect appropriate, speech clear, in no apparent distress  Head: Normocephalic, atraumatic  Ears: TM bulging and red bilaterally  Eyes: PERRL, fundi appear clear bilaterally. EOMI. Conjunctivae clear  and sclerae non-icteric  Nose: Septum midline, nares patent, no visible polyps, mucosa moist   Throat: Lips and mucosa moist. Teeth in good repair, pharynx without erythema or exudate  Neck: Supple, trachea midline. No cervical adenopathy  Back: Symmetrical and nontender  Lungs: wheezing bilateral upper lung fields, no crackles  Cardiovascular: Regular rate, normal S1, S2. No murmurs, rubs, or gallops  Abdomen: Bowel sounds active all four quadrants. Soft, non-tender. No hepatomegaly or splenomegaly. No bruits detected.   Extremities: Pulses 2+ and equal throughout. No edema. Strength equal throughout.  Integumentary: Warm and dry. Without suspicious looking lesions  Neuro: Alert and oriented, follows commands appropriately.     No orders of the defined types were placed in this encounter.  Followup: Return if symptoms worsen or fail to improve. earlier if needed.         Laura Cullen MD

## 2021-06-17 NOTE — PROGRESS NOTES
Family Medicine Office Visit  [facility]  Patient Name: Romy Beltran  Patient Age: 57 y.o.  YOB: 1960  MRN: [id number]    Date of Visit: 5/10/2018  Reason for Office Visit:   Chief Complaint   Patient presents with     Cough     Cough has been presesnt for a while. Has been worse for the last 8 days. Keeping her up at night . Body aches today. Is not sure of a fever. Not coughing anything up. Wheezing/whistling and sob.            Assessment / Plan / Medical Decision Makin. Cough - worsening  Will call in cough syrup with levaquin and longer taper of prednisone to see if any improvement.  Reviewed XR today with patient in office.  - XR Chest 2 Views  - Influenza A/B Rapid Test    2.  Myalgia - new  - flu swab done to check and noted to be negative.    3.  Wheezing  Will call in albuterol to help with the wheezing.        Health Maintenance Review  Health Maintenance   Topic Date Due     MAMMOGRAM  1960     ADVANCE DIRECTIVES DISCUSSED WITH PATIENT  1980     DEPRESSION FOLLOW UP  2018     INFLUENZA VACCINE RULE BASED (Season Ended) 2018     TD 18+ HE  2022     COLONOSCOPY  2025     TDAP ADULT ONE TIME DOSE  Completed         I am having Ms. Beltran maintain her loratadine-pseudoephedrine, multivitamin therapeutic, cholecalciferol (vitamin D3), buPROPion, fluticasone, methylPREDNISolone, and benzonatate.      HPI:  Romy Beltran is a 57 y.o. year old who presents to the office today for continued coughing for the past 2 months.  Seen in March (3/21) for coughing and had [facility] but wasn't getting better.  At our appointment given rocephin shot, levaquin, medrol dose pack and tessalon perles.  Pt states started to get better and did feel improvement but then coughing came back and been prominent for the past month.  Getting worse the past week.  Not coughing up anything but feels like she needs to.  Myalgia and  "fatigue today with low grade temperature.  Coughing throughout the night and not sleeping well.  Rib pain due to the coughing.  Winded with exertion.  Was using inhaler but then ran out.  Still \"tightness\" in the middle of the chest.        Review of Systems- pertinent positive in bold:  Constitutional: Fever, chills, night sweats, fainting, weight change, fatigue, seizures, dizziness, sleeping difficulties, loud snoring/pauses in breathing  Eyes: change in vision, blurred or double vision, redness/eye pain  Ears, nose, mouth, throat: change in hearing, ear pain, hoarseness, difficulty swallowing, sores in the mouth or throat  Respiratory: shortness of breath, cough, bloody sputum, wheezing  Cardiovascular: chest pain, palpitations   Gastrointestinal: abdominal pain, heartburn/indigestion, nausea/vomiting, change in appetite, change in bowel habits, constipation or diarrhea, rectal bleeding/dark stools, difficulty swallowing  Urinary: painful urination, frequent urination, urinary urgency/incontinence, blood in urine/dark urine, nocturia  Musculoskeletal: backache/back pain (new or increasing), weakness, joint pain/stiffness (new or increasing), muscle cramps, swelling of hands, feet, ankles, leg pain/redness  Skin: change in moles/freckles, rash, nodules  Hematologic/lymphatic: swollen lymph glands, abnormal bruising/bleeding  Endocrine: excessive thirst/urination, cold or heat intolerance  Neurologic/emotional: worrisome memory change, numbness/tingling, anxiety, mood swings      Current Scheduled Meds:  Outpatient Encounter Prescriptions as of 5/10/2018   Medication Sig Dispense Refill     buPROPion (WELLBUTRIN XL) 150 MG 24 hr tablet Take 3 tablets (450 mg total) by mouth daily. 270 tablet 3     cholecalciferol, vitamin D3, 1,000 unit tablet Take 1,000 Units by mouth daily.       fluticasone (FLONASE) 50 mcg/actuation nasal spray USE 2 SPRAYS IN EACH NOSTRIL DAILY 48 g 2     loratadine-pseudoephedrine " (CLARITIN-D 24-HOUR)  mg per 24 hr tablet Take 1 tablet by mouth daily. Prn       methylPREDNISolone (MEDROL DOSEPACK) 4 mg tablet Take 1 tablet (4 mg total) by mouth daily. follow package directions 42 tablet 0     multivitamin therapeutic (THERAGRAN) tablet Take 1 tablet by mouth daily.       benzonatate (TESSALON PERLES) 100 MG capsule Take 1 capsule (100 mg total) by mouth 3 (three) times a day as needed for cough. 30 capsule 0     No facility-administered encounter medications on file as of 5/10/2018.      No past medical history on file.  Past Surgical History:   Procedure Laterality Date     [place] LAP,DIAGNOSTIC ABDOMEN      Description: Laparoscopy (Diagnostic);  Recorded: 05/30/2012;     [place] TOTAL ABDOM HYSTERECTOMY      Description: Total Abdominal Hysterectomy;  Recorded: 02/11/2013;     Social History   Substance Use Topics     Smoking status: Never Smoker     Smokeless tobacco: Never Used     Alcohol use 1.8 oz/week     3 Glasses of wine per week       Objective / Physical Examination:  Vitals:    05/10/18 1211   BP: 110/76   Pulse: (!) 118   Resp: 16   Temp: 99.3  F (37.4  C)   SpO2: 97%   Weight: (!) 223 lb (101.2 kg)     Wt Readings from Last 3 Encounters:   05/10/18 (!) 223 lb (101.2 kg)   03/21/18 218 lb (98.9 kg)   08/07/17 (!) 233 lb 1 oz (105.7 kg)     BP Readings from Last 3 Encounters:   05/10/18 110/76   03/21/18 122/86   08/07/17 130/84     There is no height or weight on file to calculate BMI.     General Appearance: Alert and oriented, cooperative, affect appropriate, speech clear, in no apparent distress  Head: Normocephalic, atraumatic  Ears: Tympanic membrane clear with landmarks well visualized bilaterally  Eyes: PERRL, fundi appear clear bilaterally. EOMI. Conjunctivae clear and sclerae non-icteric  Nose: Septum midline, nares patent, no visible polyps, mucosa moist and without drainage  Throat: Lips and mucosa moist. Teeth in good repair, pharynx without erythema or exudate  Neck:  Supple, trachea midline. No cervical adenopathy  Back: Symmetrical and nontender  Lungs: Clear to auscultation bilaterally. Normal inspiratory and expiratory effort, slightly diminished BS on right lower lobe  Cardiovascular: Regular rate, normal S1, S2. No murmurs, rubs, or gallops    Orders Placed This Encounter   Procedures     Influenza A/B Rapid Test     XR Chest 2 Views   Followup: No Follow-up on file. earlier if needed.      Laura Cullen MD

## 2021-06-17 NOTE — PROGRESS NOTES
[facility]  [address]  [place] [address]  Dept: [phone]  Dept Fax: [phone]  Primary Provider: Rox Daley MD  Pre-op Performing Provider: ROX DALEY      PREOPERATIVE EVALUATION:  Today's date: 5/19/2021    Romy Beltran is a 60 y.o. female who presents for a preoperative evaluation.    Surgical Information:  Surgery/Procedure: colonoscopy  Surgery Location: [facility]  Surgeon: Dr Colunga  Surgery Date: 6/17/2021  Time of Surgery: unknown  Where patient plans to recover: At home with family  Fax number for surgical facility: [phone]    Type of Anesthesia Anticipated: to be determined    Assessment & Plan      The proposed surgical procedure is considered LOW risk.    1.  Preoperative examination:  No contraindications to planned procedure.  She has underlying asthma that is well controlled with current regimen and has no signs of asthma exacerbation.  She does have underlying obesity.  She reports history of snoring so may have possible undiagnosed sleep apnea.  She was encouraged to consider a sleep evaluation in the future.  We will check hemogram today as well as A1c due to history of prediabetes.  She has COVID-19 test scheduled prior to surgery    2.  History of colon polyps: Okay to proceed with planned procedure to remove large advanced tubular adenoma    3.  Chronic sinusitis: Continue fluticasone nasal spray    4.  Mild persistent asthma without complication: Stable.  No signs of exacerbation.  Continue Advair inhaler.  Continue albuterol as needed    5.  Major depression: Doing well on current regimen of bupropion.  This will be continued    6.  Prediabetes: We will check A1c today.    7.  Obesity: Encouraged healthy diet and regular exercise     Risks and Recommendations:  The patient has the following additional risks and recommendations for perioperative complications:   - No identified additional risk factors other than previously  addressed    Medication Instructions:  Patient is to take all scheduled medications on the day of surgery    RECOMMENDATION:  APPROVAL GIVEN to proceed with proposed procedure, without further diagnostic evaluation.          Subjective     HPI related to upcoming procedure: She recently underwent screening colonoscopy.  Had several polyps removed.  There was one large polyp approximately 20 to 30 mm in size.  This was an advanced tubular adenoma.  This was only biopsied during her recent colonoscopy.  She is now scheduled for procedure for excision of this polyp.  She has history of chronic sinusitis, depression, asthma, prediabetes and obesity.  Current medical conditions are stable.  She denies signs or symptoms of acute illness.  She has COVID-19 test scheduled prior to procedure.  No other questions today.    Preop Questions 5/17/2021   Have you ever had a heart attack or stroke? No   Have you ever had surgery on your heart or blood vessels, such as a stent placement, a coronary artery bypass, or surgery on an artery in your head, neck, heart, or legs? No   Do you have chest pain with activity? No   Do you have a history of  heart failure? No   Do you currently have a cold, bronchitis or symptoms of other infection? No   Do you have a cough, shortness of breath, or wheezing? No   Do you or anyone in your family have previous history of blood clots? No   Do you or does anyone in your family have a serious bleeding problem such as prolonged bleeding following surgeries or cuts? No   Have you ever had problems with anemia or been told to take iron pills? YES - in the past   Have you had any abnormal blood loss such as black, tarry or bloody stools, or abnormal vaginal bleeding? YES--h/o menorrhagia. S/p hysterectomy   Have you ever had a blood transfusion? No   Are you willing to have a blood transfusion if it is medically needed before, during, or after your surgery? Yes   Have you or any of your relatives ever  had problems with anesthesia? YES - adverse reaction to anesthesia during sinus surgery   Do you have sleep apnea, excessive snoring or daytime drowsiness? UNKNOWN--snores due to chronic sinusitis, always feels tired   Do you have any artifical heart valves or other implanted medical devices like a pacemaker, defibrillator, or continuous glucose monitor? No   Do you have artificial joints? No   Are you allergic to latex? No   Is there any chance that you may be pregnant? No     Health Care Directive:  Patient does not have a Health Care Directive or Living Will:   Pt encouraged to complete and advance directive     :[id number]}  See problem list for active medical problems.  Problems all longstanding and stable, except as noted/documented.  See ROS for pertinent symptoms related to these conditions.      Review of Systems  CONSTITUTIONAL: NEGATIVE for fever, chills, change in weight  INTEGUMENTARY/SKIN: NEGATIVE for worrisome rashes, moles or lesions  EYES: NEGATIVE for vision changes or irritation  ENT/MOUTH: NEGATIVE for ear, mouth and throat problems  RESP: NEGATIVE for significant cough or SOB  CV: NEGATIVE for chest pain, palpitations or peripheral edema  GI: NEGATIVE for nausea, abdominal pain, heartburn, or change in bowel habits  : NEGATIVE for frequency, dysuria, or hematuria  MUSCULOSKELETAL: NEGATIVE for significant arthralgias or myalgia  NEURO: NEGATIVE for weakness, dizziness or paresthesias  ENDOCRINE: NEGATIVE for temperature intolerance, skin/hair changes  HEME: NEGATIVE for bleeding problems  PSYCHIATRIC: NEGATIVE for changes in mood or affect    Patient Active Problem List    Diagnosis Date Noted     Morbid obesity (H) 12/10/2020     Mild persistent asthma without complication 12/10/2020     Moderate Recurrent Major Depression      Chronic Sinusitis      Constipation      Past Medical History:   Diagnosis Date     Arthritis Sept 2020     Asthma 2019?     Depression 1995 or so     Past Surgical  "History:   Procedure Laterality Date     HYSTERECTOMY       OOPHORECTOMY       [place] LAP,DIAGNOSTIC ABDOMEN      Description: Laparoscopy (Diagnostic);  Recorded: 2012;     [place] TOTAL ABDOM HYSTERECTOMY      Description: Total Abdominal Hysterectomy;  Recorded: 2013;     Current Outpatient Medications   Medication Sig Dispense Refill     albuterol (PROAIR HFA;PROVENTIL HFA;VENTOLIN HFA) 90 mcg/actuation inhaler TAKE 2 PUFFS BY MOUTH EVERY 6 HOURS AS NEEDED FOR WHEEZE 8.5 Inhaler 3     buPROPion (WELLBUTRIN XL) 150 MG 24 hr tablet TAKE 1 TAB BY MOUTH DAILY. TAKE IN ADDITION TO 300MG FOR TOTAL DAILY DOSE OF 450MG 90 tablet 3     buPROPion (WELLBUTRIN XL) 300 MG 24 hr tablet TAKE 1 TAB BY MOUTH DAILY IN THE AM. TAKE IN ADDITION TO 150MG FOR TOTAL DAILY DOSE OF 450MG 90 tablet 3     fluticasone propion-salmeteroL (ADVAIR) 100-50 mcg/dose DISKUS TAKE 1 PUFF BY MOUTH TWICE A  each 3     fluticasone propionate (FLONASE) 50 mcg/actuation nasal spray Apply 2 sprays into each nostril daily. 48 g 3     No current facility-administered medications for this visit.        No Known Allergies    Social History     Tobacco Use     Smoking status: Former Smoker     Packs/day: 0.50     Years: 7.00     Pack years: 3.50     Quit date: 1986     Years since quittin.7     Smokeless tobacco: Never Used   Substance Use Topics     Alcohol use: Yes     Alcohol/week: 3.0 standard drinks     Types: 3 Glasses of wine per week     Binge frequency: Weekly      Family History   Problem Relation Age of Onset     Vision loss Mother      Diabetes Mother      Arthritis Brother      Hypertension Brother      Cancer Maternal Uncle         eye cancer     Social History     Substance and Sexual Activity   Drug Use No        Objective     /76 (Patient Site: Left Arm, Patient Position: Sitting, Cuff Size: Adult Large)   Pulse 89   Temp 97.6  F (36.4  C) (Temporal)   Ht 5' 4.5\" (1.638 m)   Wt (!) 232 lb 1 oz (105.3 kg)   SpO2 " 96%   BMI 39.22 kg/m    Physical Exam    GENERAL APPEARANCE: healthy, alert and no distress     EYES: EOMI, PERRL     HENT: ear canals and TM's normal     NECK: no adenopathy, no asymmetry, masses, or scars and thyroid normal to palpation     RESP: lungs clear to auscultation - no rales, rhonchi or wheezes     CV: regular rates and rhythm, normal S1 S2, no S3 or S4 and no murmur, click or rub     ABDOMEN:  soft, nontender, no HSM or masses and bowel sounds normal     MS: extremities normal- no gross deformities noted, no evidence of inflammation in joints, FROM in all extremities.     SKIN: no suspicious lesions or rashes     NEURO: Normal strength and tone, sensory exam grossly normal, mentation intact and speech normal     PSYCH: mentation appears normal. and affect normal/bright     LYMPHATICS: No cervical adenopathy    Recent Labs   Lab Test 12/10/20  1034   HGB 14.2        PRE-OP Diagnostics:   Labs pending at this time. Results will be reviewed when available.  No EKG required, no history of coronary heart disease, significant arrhythmia, peripheral arterial disease or other structural heart disease.    REVISED CARDIAC RISK INDEX (RCRI)   The patient has the following serious cardiovascular risks for perioperative complications:   - No serious cardiac risks = 0 points    RCRI INTERPRETATION: 0 points: Class I (very low risk - 0.4% complication rate)         Signed Electronically by: Rox Traore MD    Copy of this evaluation report is provided to requesting physician.

## 2021-06-20 NOTE — PROGRESS NOTES
Assessment and Plan:Romy Beltran is a 58 y.o. with a past medical history significant for chronic sinusitis, who presents to clinic today for a chronic cough associated with wheezing and shortness of breath.  She states that the symptoms began in March.  She was initially diagnosed with bronchitis and given 4 different courses of steroids, during which the symptoms resolved but returned after stopping the steroids.  Her cough and shortness of breath are temporarily relieved with an albuterol inhaler recently prescribed by her primary care physician which she has been using 2-3 times per day.  She has noticed that the cough is worse in the morning, and reports a metallic taste in her mouth in the morning concerning for undiagnosed GERD with reflux aspiration.  She did notice mold in her house several months ago and cleaned it with bleach.  She has not had the house inspected professionally for mold remaining behind walls and floors.  She believes however that the symptoms predated the mold.  She traveled to [place] in December to January, however had no symptoms at that time.  Her PFTs today show moderate obstruction that is significantly improved with inhaled bronchodilators denoting asthma.  She denies any previous diagnosis or symptoms of asthma.  1. Start medium dose Symbicort twice daily  2. Patient given inhaler spacer and teaching today  3. Check IgE to screen for ABPA  4. Continue as needed Claritin Flonase and Mucinex as needed  5. Start H2 blocker and elevate head of bed  6. I advised the patient to have her home professionally evaluated and cleaned if necessary for further mold that may not be visible.      CCx: Episodic coughing, wheezing, and shortness of breath    HPI: Patient states that she has been having episodic coughing, shortness of breath, and wheezing for several months.  She believes the onset of symptoms coincided roughly when she noticed mold from an overflowed toilet tacking down the  walls and floor into the basement in her house.  She cleaned all visible mold with bleach, however does not know if it extended underneath other floors or walls.  Her symptoms are relieved temporarily with an albuterol inhaler that was recently prescribed by her primary care physician.  She has no prior history of asthma.  She is noted a productive cough which is worse in the morning.  She also reports a metallic taste in her mouth in the morning.  She has had 4 courses of steroids since his symptoms began, during each of which her symptoms improved but return after they have been stopped.    PMH:  Patient reports negative allergy testing several years ago  Chronic sinusitis, controlled with Claritin, Flonase, and Mucinex as needed.  No current symptoms  Myalgia    PSH:  Past Surgical History:   Procedure Laterality Date     RI LAP,DIAGNOSTIC ABDOMEN      Description: Laparoscopy (Diagnostic);  Recorded: 05/30/2012;     RI TOTAL ABDOM HYSTERECTOMY      Description: Total Abdominal Hysterectomy;  Recorded: 02/11/2013;       SH:  Social History     Social History     Marital status:      Spouse name: N/A     Number of children: N/A     Years of education: N/A     Occupational History     Not on file.     Social History Main Topics     Smoking status: Former Smoker     Packs/day: 0.50     Years: 7.00     Quit date: 8/30/1986     Smokeless tobacco: Never Used     Alcohol use 1.8 oz/week     3 Glasses of wine per week     Drug use: No     Sexual activity: Not on file     Other Topics Concern     Not on file     Social History Narrative   The patient is a retired .  No known occupational exposures.  She traveled to [place] in December and January, no symptoms at that time  Patient smoked 1/4-1/2 pack per day for 7 years and quit 31 years ago.  She had secondhand exposure to her parents in childhood, none since then.  Rare occasional alcohol use  No recreational drug use.    Family  history:  Family History   Problem Relation Age of Onset     Vision loss Mother      Diabetes Mother      Arthritis Brother      Hypertension Brother      Cancer Maternal Uncle      eye cancer   Brother  of lung cancer at age 42 years old.  He was a very light smoker.  No other known cancer or lung disease.    ROS:  A review of 12 organ systems was performed with pertinent positives and negatives noted in the HPI.    Current Meds:  Current Outpatient Prescriptions   Medication Sig     albuterol (PROAIR HFA;PROVENTIL HFA;VENTOLIN HFA) 90 mcg/actuation inhaler Inhale 2 puffs every 6 (six) hours as needed for wheezing.     budesonide-formoterol (SYMBICORT) 160-4.5 mcg/actuation inhaler Inhale 2 puffs 2 (two) times a day.     buPROPion (WELLBUTRIN XL) 150 MG 24 hr tablet Take 3 tablets (450 mg total) by mouth daily.     fluticasone (FLONASE) 50 mcg/actuation nasal spray USE 2 SPRAYS IN EACH NOSTRIL DAILY     multivitamin therapeutic (THERAGRAN) tablet Take 1 tablet by mouth daily.       Labs:  Recent Results (from the past 72 hour(s))   Hemoglobin   Result Value Ref Range    Hemoglobin 15.6 12.0 - 16.0 g/dL       I have personally reviewed all imaging and PFT data available pertinent to this visit.    Imaging studies:  No results found.    PFTs:  2018: Moderate obstruction with significant response to inhaled bronchodilators.  Normal TLC and diffusing capacity.    Physical Exam:  /74  Pulse 81  Resp 16  Wt 212 lb (96.2 kg)  SpO2 97% Comment: RA  General - Well nourished  Ears/Mouth - OP pink moist, no thrush  Neck - no cervical lymphadenopathy  Lungs - Clear to ausculation bilaterally, no crackles or wheezes appreciated  CVS - regular rhythm with no murmurs, rubs or gallups  Abdomen - soft, NT, ND, NABS  Ext - no cyanosis, clubbing or edema  Skin - no rash  Psychology - alert and oriented, answers appropriate        Electronically signed by:    Liban Francis MD  [facility] [place] Pulmonary and Critical Care  Medicine

## 2021-06-20 NOTE — PROGRESS NOTES
Patient instructed in use of albuterol and symbicort inhalers with spacer device.  Return demo and patient able to use the device without any difficulty.  States good understanding of how to use.  Sent home with step by step instructions on use of inhaler with spacer device, symbicort and albuterol inhaler.  Phone numbers given to call if any questions.  Spacer provided.

## 2021-06-20 NOTE — PROGRESS NOTES
RESPIRATORY CARE NOTE     Patient Name: Romy Beltran  Today's Date: 8/30/2018     Complete PFT done. Pt performed tests with good effort. Test results meet ATS criteria. Results scanned into epic. Pt left in no distress.  HGB drawn.      Ginny Richards

## 2021-06-20 NOTE — LETTER
Letter by Rox Daley MD at      Author: Rox Daley MD Service: -- Author Type: --    Filed:  Encounter Date: 10/13/2020 Status: (Other)         Romy Beltran  [address] [place] [address] [id number]  [place] [address]      October 13, 2020      Dear Romy Beltran,    Per our refill protocol, you are due for a medication check office visit. Your prescription for WELLBUTRIN,ALBUTEROL was sent to your pharmacy (10.12.2020). Please call [phone] to schedule an office visit with DR DALEY before your next refill is needed to avoid delays.    Thank you,  [facility]

## 2021-06-21 NOTE — PROGRESS NOTES
Assessment and Plan:  Romy Beltran is a 58 y.o. with a past medical history significant for chronic sinusitis, who presented to clinic on 8/20/2018 for a chronic cough associated with wheezing and shortness of breath.  She stated that the symptoms began in March.  She was initially diagnosed with bronchitis and given 4 different courses of steroids, during which the symptoms resolved but returned after stopping the steroids.  Her cough and shortness of breath are temporarily relieved with an albuterol inhaler recently prescribed by her primary care physician which she has been using 2-3 times per day.  She has noticed that the cough is worse in the morning, and reports a metallic taste in her mouth in the morning concerning for undiagnosed GERD with reflux aspiration.  She did notice mold in her house several months ago and cleaned it with bleach, but had not had the house inspected professionally for mold remaining behind walls and floors. She traveled to [place] in December to January, however had no symptoms at that time.  Her PFTs today show moderate obstruction that is significantly improved with inhaled bronchodilators denoting asthma.  She denies any previous diagnosis or symptoms of asthma.    10/24/2018 f/u appointment: the patient's dyspnea has all but resolved since starting symbicort, and she has only needed to use her rescue albuterol inhaler once. Her cough has also resolved since following our recommendations for reflux aspiration and post-nasal drip. She has no current respiratory complaints or concerns. She did have her home professionally evaluated and it was not found to contain any mold.    1. Switch medium dose symbicort to low-dose  2. Continue prn albuterol inhaler with spacer  3. Continue as needed Claritin Flonase and Mucinex as needed  4. continue H2 blocker and elevate head of bed    RTC in 1 year or sooner if new questions or issues arise    CCx: cough & dyspnea    HPI:    The  patient's dyspnea has all but resolved since starting symbicort, and she has only needed to use her rescue albuterol inhaler once. Her cough has also resolved since following our recommendations for reflux aspiration and post-nasal drip. She has no current respiratory complaints or concerns.     ROS:  A review of 12 organ systems was performed with pertinent positives and negatives noted in the HPI.      Current Meds:  Current Outpatient Prescriptions   Medication Sig     albuterol (PROAIR HFA;PROVENTIL HFA;VENTOLIN HFA) 90 mcg/actuation inhaler Inhale 2 puffs every 6 (six) hours as needed for wheezing.     buPROPion (WELLBUTRIN XL) 150 MG 24 hr tablet Take 3 tablets (450 mg total) by mouth daily.     fluticasone (FLONASE) 50 mcg/actuation nasal spray USE 2 SPRAYS IN EACH NOSTRIL DAILY     multivitamin therapeutic (THERAGRAN) tablet Take 1 tablet by mouth daily.     budesonide-formoterol (SYMBICORT) 80-4.5 mcg/actuation inhaler Inhale 2 puffs 2 (two) times a day.       Labs:  No results found for this or any previous visit (from the past 72 hour(s)).    I have personally reviewed all pertinent imaging studies and PFT results unless otherwise noted.    Imaging studies:  No results found.      Physical Exam:  /78  Pulse 80  Resp 20  Wt 210 lb 1.6 oz (95.3 kg)  SpO2 99% Comment: RA  General - Well nourished  Ears/Mouth -  OP pink moist, no thrush  Neck - no cervical lymphadenopathy  Lungs - Clear to ausculation bilaterally, no crackles or wheezes  CVS - regular rhythm with no murmurs, rubs or gallups  Abdomen - soft, NT, ND, NABS  Ext - no cyanosis, clubbing or edema  Skin - no rash  Psychology - alert and oriented, answers appropriate        Electronically signed by:    Liban Francis MD  [facility] Pulmonary and Critical Care Medicine

## 2021-07-03 NOTE — ADDENDUM NOTE
Addendum Note by Laura Cullen MD at 8/3/2018  9:36 AM     Author: Laura Cullen MD Service: -- Author Type: Physician    Filed: 8/3/2018  9:36 AM Encounter Date: 8/2/2018 Status: Signed    : Laura Cullen MD (Physician)    Addended by: LAURA CULLEN on: 8/3/2018 09:36 AM        Modules accepted: Orders

## 2021-07-03 NOTE — ADDENDUM NOTE
Addendum Note by Susanne Salter CMA at 5/22/2019  9:40 AM     Author: Susanne Salter CMA Service: -- Author Type: Certified Medical Assistant    Filed: 5/22/2019  1:57 PM Encounter Date: 5/22/2019 Status: Signed    : Susanne Salter CMA (Certified Medical Assistant)    Addended by: SUSANNE SALTER on: 5/22/2019 01:57 PM        Modules accepted: Orders, SmartSet

## 2021-07-14 PROBLEM — J44.89 CHRONIC OBSTRUCTIVE AIRWAY DISEASE WITH ASTHMA (H): Status: RESOLVED | Noted: 2019-05-22 | Resolved: 2020-12-10

## 2021-09-12 ENCOUNTER — HEALTH MAINTENANCE LETTER (OUTPATIENT)
Age: 61
End: 2021-09-12

## 2021-11-07 ENCOUNTER — HEALTH MAINTENANCE LETTER (OUTPATIENT)
Age: 61
End: 2021-11-07

## 2021-11-11 DIAGNOSIS — J32.9 SINUSITIS, CHRONIC: Primary | ICD-10-CM

## 2021-11-12 RX ORDER — FLUTICASONE PROPIONATE 50 MCG
SPRAY, SUSPENSION (ML) NASAL
Qty: 48 G | Refills: 1 | Status: SHIPPED | OUTPATIENT
Start: 2021-11-12 | End: 2022-05-12

## 2021-11-12 NOTE — TELEPHONE ENCOUNTER
"        Last office visit provider:  5/19/2021     Requested Prescriptions   Pending Prescriptions Disp Refills     fluticasone (FLONASE) 50 MCG/ACT nasal spray [Pharmacy Med Name: FLUTICASONE PROP NASAL SPRAY 16GM 50MCG] 48 g 3     Sig: USE 2 SPRAYS IN EACH NOSTRIL DAILY       Nasal Allergy Protocol Failed - 11/11/2021 11:57 PM        Failed - Medication is active on med list        Passed - Patient is age 12 or older        Passed - Recent (12 mo) or future (30 days) visit within the authorizing provider's specialty     Patient has had an office visit with the authorizing provider or a provider within the authorizing providers department within the previous 12 mos or has a future within next 30 days. See \"Patient Info\" tab in inbasket, or \"Choose Columns\" in Meds & Orders section of the refill encounter.                   Susie Jacob RN 11/12/21 12:16 PM    "

## 2021-11-21 DIAGNOSIS — F33.1 MAJOR DEPRESSIVE DISORDER, RECURRENT EPISODE, MODERATE (H): Primary | ICD-10-CM

## 2021-11-24 NOTE — TELEPHONE ENCOUNTER
"Last Written Prescription:        Last office visit provider:  5/19/21    Due to medication information not transferring due to [facility] please review the medication information prior to signing to ensure accuracy.    Requested Prescriptions   Pending Prescriptions Disp Refills     buPROPion (WELLBUTRIN XL) 300 MG 24 hr tablet [Pharmacy Med Name: BUPROPION HCL XL TABS 300MG] 90 tablet 3     Sig: TAKE 1 TABLET DAILY IN THE MORNING. TAKE IN ADDITION  MG FOR A TOTAL DAILY DOSE  MG       SSRIs Protocol Passed - 11/21/2021 11:21 PM        Passed - Recent (12 mo) or future (30 days) visit within the authorizing provider's specialty     Patient has had an office visit with the authorizing provider or a provider within the authorizing providers department within the previous 12 mos or has a future within next 30 days. See \"Patient Info\" tab in inbasket, or \"Choose Columns\" in Meds & Orders section of the refill encounter.              Passed - Medication is Bupropion     If the medication is Bupropion (Wellbutrin), and the patient is taking for smoking cessation; OK to refill.          Passed - Medication is active on med list        Passed - Patient is age 18 or older        Passed - No active pregnancy on record        Passed - No positive pregnancy test in last 12 months           buPROPion (WELLBUTRIN XL) 150 MG 24 hr tablet [Pharmacy Med Name: BUPROPION HCL XL TABS 150MG] 90 tablet 3     Sig: TAKE 1 TABLET DAILY. TAKE IN ADDITION  MG FOR TOTAL DAILY DOSE  MG       SSRIs Protocol Passed - 11/21/2021 11:21 PM        Passed - Recent (12 mo) or future (30 days) visit within the authorizing provider's specialty     Patient has had an office visit with the authorizing provider or a provider within the authorizing providers department within the previous 12 mos or has a future within next 30 days. See \"Patient Info\" tab in inbasket, or \"Choose Columns\" in Meds & Orders section of the refill encounter.  "             Passed - Medication is Bupropion     If the medication is Bupropion (Wellbutrin), and the patient is taking for smoking cessation; OK to refill.          Passed - Medication is active on med list        Passed - Patient is age 18 or older        Passed - No active pregnancy on record        Passed - No positive pregnancy test in last 12 months             Beatriz Michaud RN 11/23/21 6:35 PM

## 2021-11-25 RX ORDER — BUPROPION HYDROCHLORIDE 150 MG/1
TABLET ORAL
Qty: 90 TABLET | Refills: 3 | Status: SHIPPED | OUTPATIENT
Start: 2021-11-25 | End: 2022-10-17

## 2021-11-25 RX ORDER — BUPROPION HYDROCHLORIDE 300 MG/1
TABLET ORAL
Qty: 90 TABLET | Refills: 3 | Status: SHIPPED | OUTPATIENT
Start: 2021-11-25 | End: 2022-10-17

## 2022-01-09 DIAGNOSIS — J45.30 MILD PERSISTENT ASTHMA WITHOUT COMPLICATION: Primary | ICD-10-CM

## 2022-01-12 NOTE — TELEPHONE ENCOUNTER
"Routing refill request to provider for review/approval because:  Labs not current:  ACT  Patient needs to be seen because it has been more than 6 months since last office visit.  No established PCP    Last office visit provider:  5/19/21     Requested Prescriptions   Pending Prescriptions Disp Refills     ADVAIR DISKUS 100-50 MCG/DOSE inhaler [Pharmacy Med Name: ADVAIR DISKUS 60'S 100/50MCG]  3     Sig: USE 1 INHALATION TWICE A DAY       Inhaled Steroids Protocol Failed - 1/9/2022 11:41 PM        Failed - Asthma control assessment score within normal limits in last 6 months     Please review ACT score.           Failed - Medication is active on med list        Failed - Recent (6 mo) or future (30 days) visit within the authorizing provider's specialty     Patient had office visit in the last 6 months or has a visit in the next 30 days with authorizing provider or within the authorizing provider's specialty.  See \"Patient Info\" tab in inbasket, or \"Choose Columns\" in Meds & Orders section of the refill encounter.            Passed - Patient is age 12 or older       Long-Acting Beta Agonist Inhalers Protocol  Failed - 1/9/2022 11:41 PM        Failed - Asthma control assessment score within normal limits in last 6 months     Please review ACT score.           Failed - Medication is active on med list        Failed - Recent (6 mo) or future (30 days) visit within the authorizing provider's specialty     Patient had office visit in the last 6 months or has a visit in the next 30 days with authorizing provider or within the authorizing provider's specialty.  See \"Patient Info\" tab in inbasket, or \"Choose Columns\" in Meds & Orders section of the refill encounter.            Passed - Patient is age 12 or older        Passed - Order for Serevent, Striverdi, or Foradil and pt has steroid inhaler             chepe sequeira RN 01/11/22 7:48 PM  "

## 2022-04-09 DIAGNOSIS — J45.30 MILD PERSISTENT ASTHMA WITHOUT COMPLICATION: Primary | ICD-10-CM

## 2022-04-12 RX ORDER — ALBUTEROL SULFATE 90 UG/1
AEROSOL, METERED RESPIRATORY (INHALATION)
Qty: 8.5 G | Refills: 10 | Status: SHIPPED | OUTPATIENT
Start: 2022-04-12 | End: 2024-07-29

## 2022-04-12 NOTE — TELEPHONE ENCOUNTER
"Routing refill request to provider for review/approval because:  A break in medication  ACT score  No pcp listed    Last Written Prescription Date:  12/10/2020  Last Fill Quantity: 8.5 inhaler,  # refills: 3   Last office visit provider:  5/19/2021     Requested Prescriptions   Pending Prescriptions Disp Refills     albuterol (PROAIR HFA/PROVENTIL HFA/VENTOLIN HFA) 108 (90 Base) MCG/ACT inhaler [Pharmacy Med Name: ALBUTEROL HFA INHALER 8.5GM 90MCG] 8.5 g 10     Sig: USE 2 INHALATIONS EVERY 6 HOURS AS NEEDED FOR WHEEZE       Asthma Maintenance Inhalers - Anticholinergics Failed - 4/9/2022 11:49 AM        Failed - Asthma control assessment score within normal limits in last 6 months     Please review ACT score.           Failed - Recent (6 mo) or future (30 days) visit within the authorizing provider's specialty     Patient had office visit in the last 6 months or has a visit in the next 30 days with authorizing provider or within the authorizing provider's specialty.  See \"Patient Info\" tab in inbasket, or \"Choose Columns\" in Meds & Orders section of the refill encounter.            Passed - Patient is age 12 years or older        Passed - Medication is active on med list       Short-Acting Beta Agonist Inhalers Protocol  Failed - 4/9/2022 11:49 AM        Failed - Asthma control assessment score within normal limits in last 6 months     Please review ACT score.           Failed - Recent (6 mo) or future (30 days) visit within the authorizing provider's specialty     Patient had office visit in the last 6 months or has a visit in the next 30 days with authorizing provider or within the authorizing provider's specialty.  See \"Patient Info\" tab in inbasket, or \"Choose Columns\" in Meds & Orders section of the refill encounter.            Passed - Patient is age 12 or older        Passed - Medication is active on med list             Laura Pinto RN 04/11/22 11:01 PM  "

## 2022-05-10 DIAGNOSIS — J32.9 SINUSITIS, CHRONIC: ICD-10-CM

## 2022-05-12 RX ORDER — FLUTICASONE PROPIONATE 50 MCG
SPRAY, SUSPENSION (ML) NASAL
Qty: 16 G | Refills: 0 | Status: SHIPPED | OUTPATIENT
Start: 2022-05-12

## 2022-05-13 NOTE — TELEPHONE ENCOUNTER
"Last Written Prescription Date:  11/12/21  Last Fill Quantity: 48,  # refills: 1   Last office visit provider:  5/19/21     Requested Prescriptions   Pending Prescriptions Disp Refills     fluticasone (FLONASE) 50 MCG/ACT nasal spray [Pharmacy Med Name: FLUTICASONE PROP NASAL SPRAY 16GM 50MCG] 48 g 3     Sig: USE 2 SPRAYS IN EACH NOSTRIL DAILY       Nasal Allergy Protocol Passed - 5/10/2022 11:51 PM        Passed - Patient is age 12 or older        Passed - Recent (12 mo) or future (30 days) visit within the authorizing provider's specialty     Patient has had an office visit with the authorizing provider or a provider within the authorizing providers department within the previous 12 mos or has a future within next 30 days. See \"Patient Info\" tab in inbasket, or \"Choose Columns\" in Meds & Orders section of the refill encounter.              Passed - Medication is active on med list             Yeni Polk RN 05/12/22 8:53 PM  "

## 2022-10-17 ENCOUNTER — OFFICE VISIT (OUTPATIENT)
Dept: FAMILY MEDICINE | Facility: CLINIC | Age: 62
End: 2022-10-17
Payer: COMMERCIAL

## 2022-10-17 VITALS
WEIGHT: 231.9 LBS | SYSTOLIC BLOOD PRESSURE: 120 MMHG | BODY MASS INDEX: 39.19 KG/M2 | DIASTOLIC BLOOD PRESSURE: 80 MMHG | TEMPERATURE: 98.2 F | OXYGEN SATURATION: 98 % | HEART RATE: 87 BPM

## 2022-10-17 DIAGNOSIS — Z23 HIGH PRIORITY FOR 2019-NCOV VACCINE: ICD-10-CM

## 2022-10-17 DIAGNOSIS — E78.00 ELEVATED CHOLESTEROL: ICD-10-CM

## 2022-10-17 DIAGNOSIS — J45.30 MILD PERSISTENT ASTHMA WITHOUT COMPLICATION: ICD-10-CM

## 2022-10-17 DIAGNOSIS — F33.1 MAJOR DEPRESSIVE DISORDER, RECURRENT EPISODE, MODERATE (H): Primary | ICD-10-CM

## 2022-10-17 DIAGNOSIS — R73.03 PREDIABETES: ICD-10-CM

## 2022-10-17 PROCEDURE — 99214 OFFICE O/P EST MOD 30 MIN: CPT | Mod: 25 | Performed by: FAMILY MEDICINE

## 2022-10-17 PROCEDURE — 90471 IMMUNIZATION ADMIN: CPT | Performed by: FAMILY MEDICINE

## 2022-10-17 PROCEDURE — 91312 COVID-19,PF,PFIZER BOOSTER BIVALENT: CPT | Performed by: FAMILY MEDICINE

## 2022-10-17 PROCEDURE — 90472 IMMUNIZATION ADMIN EACH ADD: CPT | Performed by: FAMILY MEDICINE

## 2022-10-17 PROCEDURE — 90682 RIV4 VACC RECOMBINANT DNA IM: CPT | Performed by: FAMILY MEDICINE

## 2022-10-17 PROCEDURE — 0124A COVID-19,PF,PFIZER BOOSTER BIVALENT: CPT | Performed by: FAMILY MEDICINE

## 2022-10-17 PROCEDURE — 90715 TDAP VACCINE 7 YRS/> IM: CPT | Performed by: FAMILY MEDICINE

## 2022-10-17 RX ORDER — BUPROPION HYDROCHLORIDE 300 MG/1
TABLET ORAL
Qty: 90 TABLET | Refills: 3 | Status: SHIPPED | OUTPATIENT
Start: 2022-10-17 | End: 2023-10-23

## 2022-10-17 RX ORDER — MONTELUKAST SODIUM 10 MG/1
10 TABLET ORAL AT BEDTIME
Qty: 90 TABLET | Refills: 3 | Status: SHIPPED | OUTPATIENT
Start: 2022-10-17 | End: 2023-09-19

## 2022-10-17 RX ORDER — BUPROPION HYDROCHLORIDE 150 MG/1
TABLET ORAL
Qty: 90 TABLET | Refills: 3 | Status: SHIPPED | OUTPATIENT
Start: 2022-10-17 | End: 2023-10-23

## 2022-10-17 RX ORDER — FLUTICASONE PROPIONATE AND SALMETEROL 500; 50 UG/1; UG/1
1 POWDER RESPIRATORY (INHALATION) EVERY 12 HOURS
Qty: 3 EACH | Refills: 3 | Status: SHIPPED | OUTPATIENT
Start: 2022-10-17 | End: 2023-10-26

## 2022-10-17 ASSESSMENT — ASTHMA QUESTIONNAIRES
QUESTION_2 LAST FOUR WEEKS HOW OFTEN HAVE YOU HAD SHORTNESS OF BREATH: THREE TO SIX TIMES A WEEK
QUESTION_4 LAST FOUR WEEKS HOW OFTEN HAVE YOU USED YOUR RESCUE INHALER OR NEBULIZER MEDICATION (SUCH AS ALBUTEROL): ONCE A WEEK OR LESS
ACT_TOTALSCORE: 17
QUESTION_1 LAST FOUR WEEKS HOW MUCH OF THE TIME DID YOUR ASTHMA KEEP YOU FROM GETTING AS MUCH DONE AT WORK, SCHOOL OR AT HOME: A LITTLE OF THE TIME
QUESTION_5 LAST FOUR WEEKS HOW WOULD YOU RATE YOUR ASTHMA CONTROL: SOMEWHAT CONTROLLED
ACT_TOTALSCORE: 17
QUESTION_3 LAST FOUR WEEKS HOW OFTEN DID YOUR ASTHMA SYMPTOMS (WHEEZING, COUGHING, SHORTNESS OF BREATH, CHEST TIGHTNESS OR PAIN) WAKE YOU UP AT NIGHT OR EARLIER THAN USUAL IN THE MORNING: ONCE A WEEK

## 2022-10-17 ASSESSMENT — ANXIETY QUESTIONNAIRES
1. FEELING NERVOUS, ANXIOUS, OR ON EDGE: MORE THAN HALF THE DAYS
2. NOT BEING ABLE TO STOP OR CONTROL WORRYING: MORE THAN HALF THE DAYS
8. IF YOU CHECKED OFF ANY PROBLEMS, HOW DIFFICULT HAVE THESE MADE IT FOR YOU TO DO YOUR WORK, TAKE CARE OF THINGS AT HOME, OR GET ALONG WITH OTHER PEOPLE?: SOMEWHAT DIFFICULT
GAD7 TOTAL SCORE: 12
5. BEING SO RESTLESS THAT IT IS HARD TO SIT STILL: SEVERAL DAYS
GAD7 TOTAL SCORE: 12
3. WORRYING TOO MUCH ABOUT DIFFERENT THINGS: MORE THAN HALF THE DAYS
6. BECOMING EASILY ANNOYED OR IRRITABLE: MORE THAN HALF THE DAYS
GAD7 TOTAL SCORE: 12
7. FEELING AFRAID AS IF SOMETHING AWFUL MIGHT HAPPEN: SEVERAL DAYS
7. FEELING AFRAID AS IF SOMETHING AWFUL MIGHT HAPPEN: SEVERAL DAYS
4. TROUBLE RELAXING: MORE THAN HALF THE DAYS
IF YOU CHECKED OFF ANY PROBLEMS ON THIS QUESTIONNAIRE, HOW DIFFICULT HAVE THESE PROBLEMS MADE IT FOR YOU TO DO YOUR WORK, TAKE CARE OF THINGS AT HOME, OR GET ALONG WITH OTHER PEOPLE: SOMEWHAT DIFFICULT

## 2022-10-17 ASSESSMENT — PATIENT HEALTH QUESTIONNAIRE - PHQ9
SUM OF ALL RESPONSES TO PHQ QUESTIONS 1-9: 15
10. IF YOU CHECKED OFF ANY PROBLEMS, HOW DIFFICULT HAVE THESE PROBLEMS MADE IT FOR YOU TO DO YOUR WORK, TAKE CARE OF THINGS AT HOME, OR GET ALONG WITH OTHER PEOPLE: SOMEWHAT DIFFICULT
SUM OF ALL RESPONSES TO PHQ QUESTIONS 1-9: 15

## 2022-10-17 ASSESSMENT — PAIN SCALES - GENERAL: PAINLEVEL: NO PAIN (0)

## 2022-10-17 NOTE — PROGRESS NOTES
Assessment & Plan     Major depressive disorder, recurrent episode, moderate (H)  She is doing well on current dose of bupropion.  She is currently grieving the loss of her  and feels like she is managing her grief appropriately.  Is having some sleep difficulties and is using melatonin as needed.  Provide motional support.  Encouraged her to exercise regularly and spent time with others.  Discussed that there are prescription medications that can be used if needed to help with sleep.  - buPROPion (WELLBUTRIN XL) 150 MG 24 hr tablet  Dispense: 90 tablet; Refill: 3  - buPROPion (WELLBUTRIN XL) 300 MG 24 hr tablet  Dispense: 90 tablet; Refill: 3    High priority for 2019-nCoV vaccine    - COVID-19,PF,PFIZER BOOSTER BIVALENT 12+Yrs    Mild persistent asthma without complication  We will increase dose of Advair.  Continue albuterol inhaler as needed.  She is exposed to a lot of dust in [place].  Some of her increased asthma symptoms could be related to that.  We discussed that she may benefit from a trial of montelukast and she was interested in giving that a try.  Prescription provided.  - fluticasone-salmeterol (ADVAIR) 500-50 MCG/ACT inhaler  Dispense: 3 each; Refill: 3  - montelukast (SINGULAIR) 10 MG tablet  Dispense: 90 tablet; Refill: 3    Prediabetes  She will return for fasting labs.  We will plan to check A1c at that time.  Encouraged regular exercise, healthy diet and weight management    Elevated cholesterol  She will return for fasting labs.  We will plan to check lipids               Depression Screening Follow Up    PHQ 10/17/2022   PHQ-9 Total Score 15   Q9: Thoughts of better off dead/self-harm past 2 weeks Several days   F/U: Thoughts of suicide or self-harm Yes   F/U: Self harm-plan Yes   F/U: Self-harm action No   F/U: Safety concerns No                 Follow Up    Follow Up Actions Taken  Crisis resource information provided in the After Visit Summary    Discussed the following ways the  patient can remain in a safe environment:  be around others      No follow-ups on file.    Rox Traore MD  [facility] DANE Laguna is a 62 year old, presenting for the following health issues:  Depression ( ), Asthma (Has been wheezing more and some shortness of breath), Imm/Inj (Flu, pneumonia, covid/), and Imm/Inj (COVID-19 VACCINE)      HPI           She is seen today for medication check and follow-up visit.    Unfortunately, her  recently passed away due to metastatic melanoma.  Had his celebration of life yesterday.  She feels that she is handling her grief well.  She does have history of depression and continues on bupropion 450 mg daily.  She has had some sleep difficulties but has taken over-the-counter melatonin a couple of times and that has been helpful.    She is now living in [place] and will return there at the end of the month.  We reviewed and updated her health history.  No other significant changes in her health since she was last seen for preoperative exam prior to colonoscopy in May 2021.  Had a large polyp removed.  Next colonoscopy due in .  She has history of chronic sinusitis as well as asthma.  She reports that she has been having increased symptoms of shortness of breath with exercise and with talking a lot for the last several months.  Has also been clearing her throat more frequently.  She does use fluticasone nasal spray as needed.  She has been using her Advair inhaler.  Takes 2 puffs of the 100-50 mcg dose twice daily.  She also uses albuterol inhaler as needed.  She has not noticed any chest pain or pressure.  No chest tightness.  Does have some wheezing at times.  She was seen for an upper respiratory infection last year at an urgent care and was given a nebulizer treatment.  She found that to be very helpful and she has her own now that she will use as needed.  She has history of prediabetes.  She has been trying to follow  a healthy diet and work on weight management.  She is not fasting today.  Review of systems is assessed and is otherwise negative.  No other concerns.     She reports no significant changes in her health since that time.  She lives in [place] but has continued to spend quite a bit of time in this area.  Her mother was living in a memory care facility.  Unfortunately, she reports that her mother passed away just before the Covid pandemic began.  She has been busy tying up her mother's estate.  She does have history of asthma.  She is on Advair inhaler twice daily.  She uses albuterol as needed.  States that she was having some trouble with her asthma flaring up in August and September.  She got new inhalers from the pharmacy and she reports that since then her asthma has been under good control.  She has history of chronic sinusitis.  She uses Mucinex and Claritin as needed for head and sinus congestion.  She also has fluticasone nasal spray.  She has history of depression.  Continues on Wellbutrin.  Feels like current dose is working well.  In the past, we had discussed possibly trying to decrease the dose of this medication but she would like to continue on with the current dose at this time.  She is due for some preventive care.  She is in need of a mammogram and colonoscopy.  She has not yet had influenza vaccine.  She is due for some labs as she does have history of prediabetes.  Medications and allergies are reviewed and updated.  She has no other concerns or questions today.  Review of Systems         Objective    /80 (BP Location: Right arm, Cuff Size: Adult Regular)   Pulse 87   Temp 98.2  F (36.8  C) (Temporal)   Wt 105.2 kg (231 lb 14.4 oz)   SpO2 98%   BMI 39.19 kg/m    Body mass index is 39.19 kg/m .  Physical Exam   GENERAL: healthy, alert and no distress  HENT: ear canals and TM's normal, nose and mouth without ulcers or lesions  NECK: no adenopathy, no asymmetry, masses, or scars and  thyroid normal to palpation  RESP: lungs clear to auscultation - no rales, rhonchi or wheezes  CV: regular rate and rhythm, normal S1 S2, no S3 or S4, no murmur, click or rub, no peripheral edema and peripheral pulses strong  MS: no gross musculoskeletal defects noted, no edema                    Answers for HPI/ROS submitted by the patient on 10/17/2022  If you checked off any problems, how difficult have these problems made it for you to do your work, take care of things at home, or get along with other people?: Somewhat difficult  PHQ9 TOTAL SCORE: 15

## 2022-10-17 NOTE — PATIENT INSTRUCTIONS
Increase dose of advair inhaler    Start montelukast at bedtime    Schedule appt for fasting labs

## 2022-10-18 ENCOUNTER — LAB (OUTPATIENT)
Dept: LAB | Facility: CLINIC | Age: 62
End: 2022-10-18
Payer: COMMERCIAL

## 2022-10-18 ENCOUNTER — TELEPHONE (OUTPATIENT)
Dept: LAB | Facility: CLINIC | Age: 62
End: 2022-10-18

## 2022-10-18 DIAGNOSIS — Z13.220 LIPID SCREENING: Primary | ICD-10-CM

## 2022-10-18 DIAGNOSIS — Z13.1 SCREENING FOR DIABETES MELLITUS: ICD-10-CM

## 2022-10-18 DIAGNOSIS — Z11.4 SCREENING FOR HIV (HUMAN IMMUNODEFICIENCY VIRUS): Primary | ICD-10-CM

## 2022-10-18 DIAGNOSIS — Z13.220 LIPID SCREENING: ICD-10-CM

## 2022-10-18 DIAGNOSIS — R06.02 SHORTNESS OF BREATH: ICD-10-CM

## 2022-10-18 DIAGNOSIS — E78.2 MIXED HYPERLIPIDEMIA: ICD-10-CM

## 2022-10-18 LAB
ALBUMIN SERPL BCG-MCNC: 3.8 G/DL (ref 3.5–5.2)
ALP SERPL-CCNC: 67 U/L (ref 35–129)
ALT SERPL W P-5'-P-CCNC: 20 U/L (ref 10–50)
ANION GAP SERPL CALCULATED.3IONS-SCNC: 10 MMOL/L (ref 7–15)
AST SERPL W P-5'-P-CCNC: 18 U/L (ref 10–50)
BILIRUB SERPL-MCNC: 0.5 MG/DL
BUN SERPL-MCNC: 15.8 MG/DL (ref 8–23)
CALCIUM SERPL-MCNC: 9.2 MG/DL (ref 8.8–10.2)
CHLORIDE SERPL-SCNC: 106 MMOL/L (ref 98–107)
CHOLEST SERPL-MCNC: 200 MG/DL
CREAT SERPL-MCNC: 0.97 MG/DL (ref 0.51–1.17)
DEPRECATED HCO3 PLAS-SCNC: 24 MMOL/L (ref 22–29)
ERYTHROCYTE [DISTWIDTH] IN BLOOD BY AUTOMATED COUNT: 13 % (ref 10–15)
GFR SERPL CREATININE-BSD FRML MDRD: 67 ML/MIN/1.73M2
GLUCOSE SERPL-MCNC: 111 MG/DL (ref 70–99)
HBA1C MFR BLD: 5.7 % (ref 0–5.6)
HCT VFR BLD AUTO: 40.4 % (ref 35–53)
HDLC SERPL-MCNC: 72 MG/DL
HGB BLD-MCNC: 13.9 G/DL (ref 11.7–17.7)
HIV 1+2 AB+HIV1 P24 AG SERPL QL IA: NONREACTIVE
LDLC SERPL CALC-MCNC: 107 MG/DL
MCH RBC QN AUTO: 31.4 PG (ref 26.5–33)
MCHC RBC AUTO-ENTMCNC: 34.4 G/DL (ref 31.5–36.5)
MCV RBC AUTO: 91 FL (ref 78–100)
NONHDLC SERPL-MCNC: 128 MG/DL
PLATELET # BLD AUTO: 193 10E3/UL (ref 150–450)
POTASSIUM SERPL-SCNC: 4.2 MMOL/L (ref 3.4–5.3)
PROT SERPL-MCNC: 6.6 G/DL (ref 6.4–8.3)
RBC # BLD AUTO: 4.43 10E6/UL (ref 3.8–5.9)
SODIUM SERPL-SCNC: 140 MMOL/L (ref 136–145)
TRIGL SERPL-MCNC: 103 MG/DL
WBC # BLD AUTO: 7.5 10E3/UL (ref 4–11)

## 2022-10-18 PROCEDURE — 85027 COMPLETE CBC AUTOMATED: CPT

## 2022-10-18 PROCEDURE — 80053 COMPREHEN METABOLIC PANEL: CPT

## 2022-10-18 PROCEDURE — 36415 COLL VENOUS BLD VENIPUNCTURE: CPT

## 2022-10-18 PROCEDURE — 83036 HEMOGLOBIN GLYCOSYLATED A1C: CPT

## 2022-10-18 PROCEDURE — 80061 LIPID PANEL: CPT

## 2022-10-18 PROCEDURE — 87389 HIV-1 AG W/HIV-1&-2 AB AG IA: CPT

## 2022-11-19 ENCOUNTER — HEALTH MAINTENANCE LETTER (OUTPATIENT)
Age: 62
End: 2022-11-19

## 2023-04-09 ENCOUNTER — HEALTH MAINTENANCE LETTER (OUTPATIENT)
Age: 63
End: 2023-04-09

## 2023-09-11 ENCOUNTER — ANCILLARY PROCEDURE (OUTPATIENT)
Dept: MAMMOGRAPHY | Facility: HOSPITAL | Age: 63
End: 2023-09-11
Payer: COMMERCIAL

## 2023-09-11 DIAGNOSIS — Z12.31 VISIT FOR SCREENING MAMMOGRAM: ICD-10-CM

## 2023-09-11 PROCEDURE — 77067 SCR MAMMO BI INCL CAD: CPT

## 2023-09-18 DIAGNOSIS — J45.30 MILD PERSISTENT ASTHMA WITHOUT COMPLICATION: ICD-10-CM

## 2023-09-19 RX ORDER — MONTELUKAST SODIUM 10 MG/1
1 TABLET ORAL AT BEDTIME
Qty: 90 TABLET | Refills: 3 | Status: SHIPPED | OUTPATIENT
Start: 2023-09-19 | End: 2024-09-13

## 2023-09-21 ENCOUNTER — TRANSFERRED RECORDS (OUTPATIENT)
Dept: HEALTH INFORMATION MANAGEMENT | Facility: CLINIC | Age: 63
End: 2023-09-21
Payer: COMMERCIAL

## 2023-09-22 ASSESSMENT — ENCOUNTER SYMPTOMS
SORE THROAT: 0
HEADACHES: 0
SHORTNESS OF BREATH: 1
FREQUENCY: 0
PARESTHESIAS: 0
EYE PAIN: 0
MYALGIAS: 0
COUGH: 0
NERVOUS/ANXIOUS: 0
ABDOMINAL PAIN: 0
HEARTBURN: 0
CONSTIPATION: 0
BREAST MASS: 0
CHILLS: 0
DIARRHEA: 0
FEVER: 0
DYSURIA: 0
PALPITATIONS: 0
WEAKNESS: 0
HEMATURIA: 0
ARTHRALGIAS: 0
HEMATOCHEZIA: 0
NAUSEA: 0
DIZZINESS: 0
JOINT SWELLING: 0

## 2023-09-22 ASSESSMENT — ASTHMA QUESTIONNAIRES: ACT_TOTALSCORE: 20

## 2023-09-27 ENCOUNTER — OFFICE VISIT (OUTPATIENT)
Dept: FAMILY MEDICINE | Facility: CLINIC | Age: 63
End: 2023-09-27
Payer: COMMERCIAL

## 2023-09-27 VITALS
HEART RATE: 86 BPM | OXYGEN SATURATION: 96 % | HEIGHT: 64 IN | BODY MASS INDEX: 40.99 KG/M2 | TEMPERATURE: 97.5 F | DIASTOLIC BLOOD PRESSURE: 60 MMHG | WEIGHT: 240.08 LBS | SYSTOLIC BLOOD PRESSURE: 100 MMHG

## 2023-09-27 DIAGNOSIS — R73.03 PRE-DIABETES: ICD-10-CM

## 2023-09-27 DIAGNOSIS — Z90.710 S/P HYSTERECTOMY: ICD-10-CM

## 2023-09-27 DIAGNOSIS — Z00.00 ANNUAL PHYSICAL EXAM: ICD-10-CM

## 2023-09-27 DIAGNOSIS — F33.1 MAJOR DEPRESSIVE DISORDER, RECURRENT EPISODE, MODERATE (H): Primary | ICD-10-CM

## 2023-09-27 DIAGNOSIS — J32.9 CHRONIC SINUSITIS, UNSPECIFIED LOCATION: ICD-10-CM

## 2023-09-27 DIAGNOSIS — J45.30 MILD PERSISTENT ASTHMA WITHOUT COMPLICATION: ICD-10-CM

## 2023-09-27 LAB
ALBUMIN SERPL BCG-MCNC: 4.1 G/DL (ref 3.5–5.2)
ALP SERPL-CCNC: 75 U/L (ref 35–129)
ALT SERPL W P-5'-P-CCNC: 19 U/L (ref 0–70)
ANION GAP SERPL CALCULATED.3IONS-SCNC: 13 MMOL/L (ref 7–15)
AST SERPL W P-5'-P-CCNC: 20 U/L (ref 0–45)
BILIRUB SERPL-MCNC: 0.3 MG/DL
BUN SERPL-MCNC: 15.1 MG/DL (ref 8–23)
CALCIUM SERPL-MCNC: 9.6 MG/DL (ref 8.8–10.2)
CHLORIDE SERPL-SCNC: 104 MMOL/L (ref 98–107)
CHOLEST SERPL-MCNC: 226 MG/DL
CREAT SERPL-MCNC: 1.06 MG/DL (ref 0.51–1.17)
DEPRECATED HCO3 PLAS-SCNC: 24 MMOL/L (ref 22–29)
EGFRCR SERPLBLD CKD-EPI 2021: 59 ML/MIN/1.73M2
ERYTHROCYTE [DISTWIDTH] IN BLOOD BY AUTOMATED COUNT: 13.2 % (ref 10–15)
GLUCOSE SERPL-MCNC: 93 MG/DL (ref 70–99)
HBA1C MFR BLD: 5.9 % (ref 0–5.6)
HCT VFR BLD AUTO: 42 % (ref 35–53)
HDLC SERPL-MCNC: 79 MG/DL
HGB BLD-MCNC: 14.2 G/DL (ref 11.7–17.7)
LDLC SERPL CALC-MCNC: 126 MG/DL
MCH RBC QN AUTO: 31.3 PG (ref 26.5–33)
MCHC RBC AUTO-ENTMCNC: 33.8 G/DL (ref 31.5–36.5)
MCV RBC AUTO: 93 FL (ref 78–100)
NONHDLC SERPL-MCNC: 147 MG/DL
PLATELET # BLD AUTO: 229 10E3/UL (ref 150–450)
POTASSIUM SERPL-SCNC: 4.3 MMOL/L (ref 3.4–5.3)
PROT SERPL-MCNC: 7.2 G/DL (ref 6.4–8.3)
RBC # BLD AUTO: 4.54 10E6/UL (ref 3.8–5.9)
SODIUM SERPL-SCNC: 141 MMOL/L (ref 135–145)
TRIGL SERPL-MCNC: 106 MG/DL
WBC # BLD AUTO: 8.6 10E3/UL (ref 4–11)

## 2023-09-27 PROCEDURE — 83036 HEMOGLOBIN GLYCOSYLATED A1C: CPT | Performed by: STUDENT IN AN ORGANIZED HEALTH CARE EDUCATION/TRAINING PROGRAM

## 2023-09-27 PROCEDURE — 85027 COMPLETE CBC AUTOMATED: CPT | Performed by: STUDENT IN AN ORGANIZED HEALTH CARE EDUCATION/TRAINING PROGRAM

## 2023-09-27 PROCEDURE — 80061 LIPID PANEL: CPT | Performed by: STUDENT IN AN ORGANIZED HEALTH CARE EDUCATION/TRAINING PROGRAM

## 2023-09-27 PROCEDURE — 90677 PCV20 VACCINE IM: CPT | Performed by: STUDENT IN AN ORGANIZED HEALTH CARE EDUCATION/TRAINING PROGRAM

## 2023-09-27 PROCEDURE — 80053 COMPREHEN METABOLIC PANEL: CPT | Performed by: STUDENT IN AN ORGANIZED HEALTH CARE EDUCATION/TRAINING PROGRAM

## 2023-09-27 PROCEDURE — 36415 COLL VENOUS BLD VENIPUNCTURE: CPT | Performed by: STUDENT IN AN ORGANIZED HEALTH CARE EDUCATION/TRAINING PROGRAM

## 2023-09-27 PROCEDURE — 90471 IMMUNIZATION ADMIN: CPT | Performed by: STUDENT IN AN ORGANIZED HEALTH CARE EDUCATION/TRAINING PROGRAM

## 2023-09-27 PROCEDURE — 99396 PREV VISIT EST AGE 40-64: CPT | Mod: 25 | Performed by: STUDENT IN AN ORGANIZED HEALTH CARE EDUCATION/TRAINING PROGRAM

## 2023-09-27 ASSESSMENT — PAIN SCALES - GENERAL: PAINLEVEL: NO PAIN (0)

## 2023-09-27 ASSESSMENT — ANXIETY QUESTIONNAIRES
2. NOT BEING ABLE TO STOP OR CONTROL WORRYING: NOT AT ALL
5. BEING SO RESTLESS THAT IT IS HARD TO SIT STILL: NOT AT ALL
1. FEELING NERVOUS, ANXIOUS, OR ON EDGE: SEVERAL DAYS
GAD7 TOTAL SCORE: 2
IF YOU CHECKED OFF ANY PROBLEMS ON THIS QUESTIONNAIRE, HOW DIFFICULT HAVE THESE PROBLEMS MADE IT FOR YOU TO DO YOUR WORK, TAKE CARE OF THINGS AT HOME, OR GET ALONG WITH OTHER PEOPLE: NOT DIFFICULT AT ALL
3. WORRYING TOO MUCH ABOUT DIFFERENT THINGS: SEVERAL DAYS
6. BECOMING EASILY ANNOYED OR IRRITABLE: NOT AT ALL
GAD7 TOTAL SCORE: 2
7. FEELING AFRAID AS IF SOMETHING AWFUL MIGHT HAPPEN: NOT AT ALL

## 2023-09-27 ASSESSMENT — PATIENT HEALTH QUESTIONNAIRE - PHQ9
SUM OF ALL RESPONSES TO PHQ QUESTIONS 1-9: 6
5. POOR APPETITE OR OVEREATING: NOT AT ALL

## 2023-09-27 NOTE — PROGRESS NOTES
Assessment/ Plan   63 year old female with PMH of depression, obesity, asthma, and sinusitis who presents for annual physical exam. Had some swelling in her ankles recently with long road trip but this resolved.    1. Mild persistent asthma without complication    2. Chronic sinusitis, unspecified location    3. Moderate Recurrent Major Depression    4. S/P hysterectomy    5. Pre-diabetes  - Hemoglobin A1c; Future    6. Annual physical exam  - Comprehensive metabolic panel (BMP + Alb, Alk Phos, ALT, AST, Total. Bili, TP); Future  - CBC with platelets; Future  - Lipid panel reflex to direct LDL Fasting; Future    Follow-up in:    Vladimir Horton MD    Subjective:     Romy Beltran is a 63 year old female who presents for an annual exam.     Chief Complaint   Patient presents with    Physical    Foot Swelling     Colonoscopy: last week- repeat in 3 years  Dexa: never  Mammogram: 9/23- normal  Pap smear: s/p hysterectomy    Immunization History   Administered Date(s) Administered    COVID-19 Bivalent 12+ (Pfizer) 10/17/2022    COVID-19 MONOVALENT 12+ (Pfizer) 04/22/2021, 05/13/2021    Flu, Unspecified 08/30/2018    Influenza Vaccine 18-64 (Flublok) 12/10/2020, 10/17/2022    Influenza Vaccine >6 months (Alfuria,Fluzone) 08/30/2018    Pneumococcal 23 valent 12/10/2020    TDAP (Adacel,Boostrix) 05/30/2012, 10/17/2022    Td (Adult), Adsorbed 1962    Td,adult,historic,unspecified 1962     Immunization status: due today.     Current Outpatient Medications   Medication Sig Dispense Refill    albuterol (PROAIR HFA/PROVENTIL HFA/VENTOLIN HFA) 108 (90 Base) MCG/ACT inhaler USE 2 INHALATIONS EVERY 6 HOURS AS NEEDED FOR WHEEZE 8.5 g 10    buPROPion (WELLBUTRIN XL) 150 MG 24 hr tablet TAKE 1 TABLET DAILY. TAKE IN ADDITION  MG FOR TOTAL DAILY DOSE  MG Strength: 150 mg 90 tablet 3    buPROPion (WELLBUTRIN XL) 300 MG 24 hr tablet TAKE 1 TABLET DAILY IN THE MORNING. TAKE IN ADDITION  MG FOR A TOTAL DAILY  DOSE  MG Strength: 300 mg 90 tablet 3    fluticasone (FLONASE) 50 MCG/ACT nasal spray USE 2 SPRAYS IN EACH NOSTRIL DAILY 16 g 0    fluticasone-salmeterol (ADVAIR) 500-50 MCG/ACT inhaler Inhale 1 puff into the lungs every 12 hours 3 each 3    montelukast (SINGULAIR) 10 MG tablet TAKE 1 TABLET AT BEDTIME 90 tablet 3     Past Medical History:   Diagnosis Date    Arthritis 2020    Asthma 2019?    Depression  or so     Past Surgical History:   Procedure Laterality Date    HYSTERECTOMY      OOPHORECTOMY      [place] LAP,DIAGNOSTIC ABDOMEN      Description: Laparoscopy (Diagnostic);  Recorded: 2012;    C TOTAL ABDOM HYSTERECTOMY      Description: Total Abdominal Hysterectomy;  Recorded: 2013;     Patient has no known allergies.  Family History   Problem Relation Age of Onset    Vision Loss Mother     Diabetes Mother     Arthritis Brother     Hypertension Brother     Cancer Maternal Uncle         eye cancer     Social History     Socioeconomic History    Marital status:      Spouse name: Not on file    Number of children: Not on file    Years of education: Not on file    Highest education level: Not on file   Occupational History    Not on file   Tobacco Use    Smoking status: Former     Packs/day: 0.50     Years: 7.00     Pack years: 3.50     Types: Cigarettes     Quit date: 1986     Years since quittin.1    Smokeless tobacco: Never   Substance and Sexual Activity    Alcohol use: Yes     Alcohol/week: 3.0 standard drinks of alcohol    Drug use: No    Sexual activity: Yes     Partners: Male     Birth control/protection: Surgical   Other Topics Concern    Not on file   Social History Narrative    Not on file     Social Determinants of Health     Financial Resource Strain: Low Risk  (2023)    Financial Resource Strain     Within the past 12 months, have you or your family members you live with been unable to get utilities (heat, electricity) when it was really needed?: No   Food  "Insecurity: Low Risk  (9/22/2023)    Food Insecurity     Within the past 12 months, did you worry that your food would run out before you got money to buy more?: No     Within the past 12 months, did the food you bought just not last and you didn t have money to get more?: No   Transportation Needs: Low Risk  (9/22/2023)    Transportation Needs     Within the past 12 months, has lack of transportation kept you from medical appointments, getting your medicines, non-medical meetings or appointments, work, or from getting things that you need?: No   Physical Activity: Not on file   Stress: Not on file   Social Connections: Not on file   Interpersonal Safety: Low Risk  (9/27/2023)    Interpersonal Safety     Do you feel physically and emotionally safe where you currently live?: Yes     Within the past 12 months, have you been hit, slapped, kicked or otherwise physically hurt by someone?: No     Within the past 12 months, have you been humiliated or emotionally abused in other ways by your partner or ex-partner?: No   Housing Stability: Low Risk  (9/22/2023)    Housing Stability     Do you have housing? : Yes     Are you worried about losing your housing?: No       Review of Systems  Complete ROS negative except as noted in the HPI    Objective:      Vitals:    09/27/23 0659   BP: 100/60   Pulse: 86   Temp: 97.5  F (36.4  C)   SpO2: 96%   Weight: 108.9 kg (240 lb 1.3 oz)   Height: 1.626 m (5' 4\")       General appearance: Alert, cooperative, no distress, appears stated age, obese  Head: Normocephalic, atraumatic, without obvious abnormality  EARS: TM's gray dull with structures seen bilaterally  Eyes: Pupils equal round, reactive.  Conjunctiva clear.  Nose: Nares normal, no drainage.  Throat: Lips, mucosa, tongue normal mucosa pink and moist  Neck: Supple, symmetric, trachea midline, no adenopathy.  No thyroid enlargement, tenderness or nodules.    Lungs: Clear to auscultation bilaterally, no wheezing or crackles " present.  Respirations unlabored  Heart: Regular rate and rhythm, normal S1 and S2, no murmur, rub or gallop.  Abdomen: Soft, nontender, nondistended.  Bowel sounds active in all 4 quadrants.  No masses or organomegaly.  Extremities: Extremities normal, atraumatic.  No cyanosis or edema.  Skin: Skin color, texture, turgor normal no rashes or lesions on limited skin exam  Neurologic: CN II through XII intact, normal strength.      Vladimir Garcia MD

## 2023-09-28 NOTE — RESULT ENCOUNTER NOTE
Luz Elena,  Your results from your recent clinic visit show:  Your CMP shows mild kidney disease.  This is the GFR. Lets recheck this in a year. It is nothing to be concerned about now.   Your lipids look ok and I used these as well as other factors from your history to calculate your 10 year risk of having something like a heart attack (ASCVD risk) and it was low risk. Just continue to work on exercise and diet to maintain this low risk.    The 10-year ASCVD risk score (Cory MONTERO, et al., 2019) is: 2%    Values used to calculate the score:      Age: 63 years      Sex: Female      Is Non- : No      Diabetic: No      Tobacco smoker: No      Systolic Blood Pressure: 100 mmHg      Is BP treated: No      HDL Cholesterol: 79 mg/dL      Total Cholesterol: 226 mg/dL    Your A1C is still in the prediabetic range  Your CBC is normal with no anemia or signs of infection seen    If you have more questions please call the clinic at [phone] or send me a [employer] message    Dr. Vladimir Horton

## 2023-10-19 DIAGNOSIS — F33.1 MAJOR DEPRESSIVE DISORDER, RECURRENT EPISODE, MODERATE (H): ICD-10-CM

## 2023-10-23 RX ORDER — BUPROPION HYDROCHLORIDE 150 MG/1
TABLET ORAL
Qty: 90 TABLET | Refills: 3 | Status: SHIPPED | OUTPATIENT
Start: 2023-10-23 | End: 2024-09-30

## 2023-10-23 RX ORDER — BUPROPION HYDROCHLORIDE 300 MG/1
TABLET ORAL
Qty: 90 TABLET | Refills: 3 | Status: SHIPPED | OUTPATIENT
Start: 2023-10-23 | End: 2024-09-30

## 2023-10-25 DIAGNOSIS — J45.30 MILD PERSISTENT ASTHMA WITHOUT COMPLICATION: ICD-10-CM

## 2023-10-26 RX ORDER — FLUTICASONE PROPIONATE AND SALMETEROL 50; 500 UG/1; UG/1
POWDER RESPIRATORY (INHALATION)
Qty: 180 EACH | Refills: 0 | Status: SHIPPED | OUTPATIENT
Start: 2023-10-26 | End: 2024-01-24

## 2023-10-26 NOTE — TELEPHONE ENCOUNTER
Prescription approved per [facility] Refill Protocol.  Kayli FARIAS RN  [facility]

## 2023-11-21 ENCOUNTER — PATIENT OUTREACH (OUTPATIENT)
Dept: GASTROENTEROLOGY | Facility: CLINIC | Age: 63
End: 2023-11-21
Payer: COMMERCIAL

## 2024-01-23 DIAGNOSIS — J45.30 MILD PERSISTENT ASTHMA WITHOUT COMPLICATION: ICD-10-CM

## 2024-01-24 RX ORDER — FLUTICASONE PROPIONATE AND SALMETEROL 500; 50 UG/1; UG/1
POWDER RESPIRATORY (INHALATION)
Qty: 60 EACH | Refills: 3 | Status: SHIPPED | OUTPATIENT
Start: 2024-01-24 | End: 2024-07-29

## 2024-07-29 DIAGNOSIS — J45.30 MILD PERSISTENT ASTHMA WITHOUT COMPLICATION: ICD-10-CM

## 2024-07-29 NOTE — TELEPHONE ENCOUNTER
Patient called and stated that she is in need of her Advair inhaler and some more rescue inhalers.     Orders pended for provider review.     Seb Jackson RN  [facility]

## 2024-07-30 RX ORDER — ALBUTEROL SULFATE 90 UG/1
2 AEROSOL, METERED RESPIRATORY (INHALATION) EVERY 6 HOURS PRN
Qty: 8.5 G | Refills: 0 | Status: SHIPPED | OUTPATIENT
Start: 2024-07-30

## 2024-07-30 RX ORDER — FLUTICASONE PROPIONATE AND SALMETEROL 500; 50 UG/1; UG/1
1 POWDER RESPIRATORY (INHALATION) EVERY 12 HOURS
Qty: 60 EACH | Refills: 2 | Status: SHIPPED | OUTPATIENT
Start: 2024-07-30

## 2024-09-12 DIAGNOSIS — J45.30 MILD PERSISTENT ASTHMA WITHOUT COMPLICATION: ICD-10-CM

## 2024-09-13 RX ORDER — MONTELUKAST SODIUM 10 MG/1
1 TABLET ORAL AT BEDTIME
Qty: 90 TABLET | Refills: 0 | Status: SHIPPED | OUTPATIENT
Start: 2024-09-13

## 2024-09-13 NOTE — TELEPHONE ENCOUNTER
Patient is due for a physical. Refilled montelukast for 90 days. Please call and inform them and help make physical in this time for further refills.    Vladimir Horton MD

## 2024-09-29 SDOH — HEALTH STABILITY: PHYSICAL HEALTH: ON AVERAGE, HOW MANY DAYS PER WEEK DO YOU ENGAGE IN MODERATE TO STRENUOUS EXERCISE (LIKE A BRISK WALK)?: 4 DAYS

## 2024-09-29 SDOH — HEALTH STABILITY: PHYSICAL HEALTH: ON AVERAGE, HOW MANY MINUTES DO YOU ENGAGE IN EXERCISE AT THIS LEVEL?: 30 MIN

## 2024-09-29 ASSESSMENT — ASTHMA QUESTIONNAIRES
QUESTION_2 LAST FOUR WEEKS HOW OFTEN HAVE YOU HAD SHORTNESS OF BREATH: ONCE OR TWICE A WEEK
QUESTION_5 LAST FOUR WEEKS HOW WOULD YOU RATE YOUR ASTHMA CONTROL: WELL CONTROLLED
ACT_TOTALSCORE: 22
QUESTION_4 LAST FOUR WEEKS HOW OFTEN HAVE YOU USED YOUR RESCUE INHALER OR NEBULIZER MEDICATION (SUCH AS ALBUTEROL): ONCE A WEEK OR LESS
QUESTION_1 LAST FOUR WEEKS HOW MUCH OF THE TIME DID YOUR ASTHMA KEEP YOU FROM GETTING AS MUCH DONE AT WORK, SCHOOL OR AT HOME: NONE OF THE TIME
ACT_TOTALSCORE: 22
QUESTION_3 LAST FOUR WEEKS HOW OFTEN DID YOUR ASTHMA SYMPTOMS (WHEEZING, COUGHING, SHORTNESS OF BREATH, CHEST TIGHTNESS OR PAIN) WAKE YOU UP AT NIGHT OR EARLIER THAN USUAL IN THE MORNING: NOT AT ALL

## 2024-09-29 ASSESSMENT — PATIENT HEALTH QUESTIONNAIRE - PHQ9
10. IF YOU CHECKED OFF ANY PROBLEMS, HOW DIFFICULT HAVE THESE PROBLEMS MADE IT FOR YOU TO DO YOUR WORK, TAKE CARE OF THINGS AT HOME, OR GET ALONG WITH OTHER PEOPLE: SOMEWHAT DIFFICULT
SUM OF ALL RESPONSES TO PHQ QUESTIONS 1-9: 10
SUM OF ALL RESPONSES TO PHQ QUESTIONS 1-9: 10

## 2024-09-29 ASSESSMENT — SOCIAL DETERMINANTS OF HEALTH (SDOH): HOW OFTEN DO YOU GET TOGETHER WITH FRIENDS OR RELATIVES?: MORE THAN THREE TIMES A WEEK

## 2024-09-30 ENCOUNTER — OFFICE VISIT (OUTPATIENT)
Dept: FAMILY MEDICINE | Facility: CLINIC | Age: 64
End: 2024-09-30
Payer: COMMERCIAL

## 2024-09-30 VITALS
HEART RATE: 75 BPM | OXYGEN SATURATION: 96 % | TEMPERATURE: 97.8 F | RESPIRATION RATE: 21 BRPM | DIASTOLIC BLOOD PRESSURE: 80 MMHG | HEIGHT: 64 IN | SYSTOLIC BLOOD PRESSURE: 120 MMHG | WEIGHT: 210 LBS | BODY MASS INDEX: 35.85 KG/M2

## 2024-09-30 DIAGNOSIS — J32.9 CHRONIC SINUSITIS, UNSPECIFIED LOCATION: ICD-10-CM

## 2024-09-30 DIAGNOSIS — F33.1 MAJOR DEPRESSIVE DISORDER, RECURRENT EPISODE, MODERATE (H): ICD-10-CM

## 2024-09-30 DIAGNOSIS — R73.03 PRE-DIABETES: ICD-10-CM

## 2024-09-30 DIAGNOSIS — J45.30 MILD PERSISTENT ASTHMA WITHOUT COMPLICATION: ICD-10-CM

## 2024-09-30 DIAGNOSIS — Z00.00 HEALTHCARE MAINTENANCE: ICD-10-CM

## 2024-09-30 DIAGNOSIS — E66.01 MORBID OBESITY (H): ICD-10-CM

## 2024-09-30 DIAGNOSIS — Z00.00 ANNUAL PHYSICAL EXAM: Primary | ICD-10-CM

## 2024-09-30 DIAGNOSIS — Z12.31 ENCOUNTER FOR SCREENING MAMMOGRAM FOR BREAST CANCER: ICD-10-CM

## 2024-09-30 LAB
ALBUMIN SERPL BCG-MCNC: 3.9 G/DL (ref 3.5–5.2)
ALP SERPL-CCNC: 71 U/L (ref 40–150)
ALT SERPL W P-5'-P-CCNC: 23 U/L (ref 0–70)
ANION GAP SERPL CALCULATED.3IONS-SCNC: 11 MMOL/L (ref 7–15)
AST SERPL W P-5'-P-CCNC: 23 U/L (ref 0–45)
BILIRUB SERPL-MCNC: 0.3 MG/DL
BUN SERPL-MCNC: 15.8 MG/DL (ref 8–23)
CALCIUM SERPL-MCNC: 9.5 MG/DL (ref 8.8–10.4)
CHLORIDE SERPL-SCNC: 107 MMOL/L (ref 98–107)
CHOLEST SERPL-MCNC: 226 MG/DL
CREAT SERPL-MCNC: 1.06 MG/DL (ref 0.51–1.17)
EGFRCR SERPLBLD CKD-EPI 2021: 59 ML/MIN/1.73M2
ERYTHROCYTE [DISTWIDTH] IN BLOOD BY AUTOMATED COUNT: 13 % (ref 10–15)
EST. AVERAGE GLUCOSE BLD GHB EST-MCNC: 114 MG/DL
FASTING STATUS PATIENT QL REPORTED: ABNORMAL
FASTING STATUS PATIENT QL REPORTED: ABNORMAL
GLUCOSE SERPL-MCNC: 110 MG/DL (ref 70–99)
HBA1C MFR BLD: 5.6 % (ref 0–5.6)
HCO3 SERPL-SCNC: 24 MMOL/L (ref 22–29)
HCT VFR BLD AUTO: 42.4 % (ref 35–53)
HDLC SERPL-MCNC: 91 MG/DL
HGB BLD-MCNC: 14.1 G/DL (ref 11.7–17.7)
LDLC SERPL CALC-MCNC: 121 MG/DL
MCH RBC QN AUTO: 30.8 PG (ref 26.5–33)
MCHC RBC AUTO-ENTMCNC: 33.3 G/DL (ref 31.5–36.5)
MCV RBC AUTO: 93 FL (ref 78–100)
NONHDLC SERPL-MCNC: 135 MG/DL
PLATELET # BLD AUTO: 231 10E3/UL (ref 150–450)
POTASSIUM SERPL-SCNC: 4.7 MMOL/L (ref 3.4–5.3)
PROT SERPL-MCNC: 6.9 G/DL (ref 6.4–8.3)
RBC # BLD AUTO: 4.58 10E6/UL (ref 3.8–5.9)
SODIUM SERPL-SCNC: 142 MMOL/L (ref 135–145)
TRIGL SERPL-MCNC: 69 MG/DL
WBC # BLD AUTO: 7.1 10E3/UL (ref 4–11)

## 2024-09-30 PROCEDURE — 85027 COMPLETE CBC AUTOMATED: CPT | Performed by: STUDENT IN AN ORGANIZED HEALTH CARE EDUCATION/TRAINING PROGRAM

## 2024-09-30 PROCEDURE — 36415 COLL VENOUS BLD VENIPUNCTURE: CPT | Performed by: STUDENT IN AN ORGANIZED HEALTH CARE EDUCATION/TRAINING PROGRAM

## 2024-09-30 PROCEDURE — 80061 LIPID PANEL: CPT | Performed by: STUDENT IN AN ORGANIZED HEALTH CARE EDUCATION/TRAINING PROGRAM

## 2024-09-30 PROCEDURE — 80053 COMPREHEN METABOLIC PANEL: CPT | Performed by: STUDENT IN AN ORGANIZED HEALTH CARE EDUCATION/TRAINING PROGRAM

## 2024-09-30 PROCEDURE — 83036 HEMOGLOBIN GLYCOSYLATED A1C: CPT | Performed by: STUDENT IN AN ORGANIZED HEALTH CARE EDUCATION/TRAINING PROGRAM

## 2024-09-30 PROCEDURE — 91320 SARSCV2 VAC 30MCG TRS-SUC IM: CPT | Performed by: STUDENT IN AN ORGANIZED HEALTH CARE EDUCATION/TRAINING PROGRAM

## 2024-09-30 PROCEDURE — 90471 IMMUNIZATION ADMIN: CPT | Performed by: STUDENT IN AN ORGANIZED HEALTH CARE EDUCATION/TRAINING PROGRAM

## 2024-09-30 PROCEDURE — 90673 RIV3 VACCINE NO PRESERV IM: CPT | Performed by: STUDENT IN AN ORGANIZED HEALTH CARE EDUCATION/TRAINING PROGRAM

## 2024-09-30 PROCEDURE — 90480 ADMN SARSCOV2 VAC 1/ONLY CMP: CPT | Performed by: STUDENT IN AN ORGANIZED HEALTH CARE EDUCATION/TRAINING PROGRAM

## 2024-09-30 PROCEDURE — 99396 PREV VISIT EST AGE 40-64: CPT | Mod: 25 | Performed by: STUDENT IN AN ORGANIZED HEALTH CARE EDUCATION/TRAINING PROGRAM

## 2024-09-30 RX ORDER — BUPROPION HYDROCHLORIDE 150 MG/1
TABLET ORAL
Qty: 90 TABLET | Refills: 3 | Status: SHIPPED | OUTPATIENT
Start: 2024-09-30

## 2024-09-30 RX ORDER — BUPROPION HYDROCHLORIDE 300 MG/1
TABLET ORAL
Qty: 90 TABLET | Refills: 3 | Status: SHIPPED | OUTPATIENT
Start: 2024-09-30

## 2024-09-30 ASSESSMENT — PAIN SCALES - GENERAL: PAINLEVEL: NO PAIN (0)

## 2024-09-30 NOTE — PROGRESS NOTES
Assessment/ Plan   64-year-old female with past medical history of asthma, chronic sinusitis, prediabetes, obesity, depression who presents for annual physical exam    1. Annual physical exam  - Comprehensive metabolic panel (BMP + Alb, Alk Phos, ALT, AST, Total. Bili, TP); Future  - CBC with platelets; Future  - Lipid panel reflex to direct LDL Fasting; Future    2. Pre-diabetes  - Hemoglobin A1c; Future    3. Morbid obesity (H)    4. Chronic sinusitis, unspecified location  Chronic issue. At one time she was supposed to have sinus surgery but this was stopped mid surgery due to anesthesia issues. She often have symptoms of headache, sinus drainage, sneezing. Uses flonase as needed. As well as an antihistamine as needed. Sometimes brian campos.     9/24:  Having some postnasal drip symptoms lately and I recommended considering daily antihistamine and flonase use instead of as needed.  She does get some nosebleeds with daily flonase in the past.    5. Mild persistent asthma without complication  On Advair and Singulair daily, albuterol PRN        10/17/2022    10:42 AM 9/22/2023     7:32 AM 9/29/2024    10:35 AM   ACT Total Scores   ACT TOTAL SCORE (Goal Greater than or Equal to 20) 17 20 22   In the past 12 months, how many times did you visit the emergency room for your asthma without being admitted to the hospital? 0 0 0   In the past 12 months, how many times were you hospitalized overnight because of your asthma? 0 0 0       6. Moderate Recurrent Major Depression  No side effects on wellbutrin. Familia anxiety lately but feel Wellbutrin is still working well.     Current Treatment:  Wellbutrin 450 mg daily. No side effects        10/17/2022    10:24 AM 9/27/2023     8:06 AM 9/29/2024    10:27 AM   PHQ   PHQ-9 Total Score 15 6 10   Q9: Thoughts of better off dead/self-harm past 2 weeks Several days Not at all Not at all   F/U: Thoughts of suicide or self-harm Yes     F/U: Self harm-plan Yes     F/U: Self-harm action  No     F/U: Safety concerns No           10/17/2022    10:33 AM 9/27/2023     8:06 AM   KYRA-7 SCORE   Total Score 12 (moderate anxiety)    Total Score 12 2     - buPROPion (WELLBUTRIN XL) 150 MG 24 hr tablet; TAKE 1 TABLET DAILY IN ADDITION  MG FOR TOTAL DAILY DOSE  MG  Dispense: 90 tablet; Refill: 3  - buPROPion (WELLBUTRIN XL) 300 MG 24 hr tablet; TAKE 1 TABLET DAILY IN THE MORNING, IN ADDITION  MG FOR A TOTAL DAILY DOSE  MG  Dispense: 90 tablet; Refill: 3    7. Healthcare maintenance    8. Encounter for screening mammogram for breast cancer  - MA Screen Bilateral w/[facility]; Future    Follow-up in:    Vladimir Horton MD    Counseling  Appropriate preventive services were discussed with this patient, including applicable screening as appropriate for fall prevention, nutrition, physical activity, Tobacco-use cessation, weight loss and cognition    Subjective:     Romy Beltran is a 64 year old female who presents for an annual exam.     Chief Complaint   Patient presents with    Physical     Cough in the mornings, possible from asthma        Mammogram-9/23  Colonoscopy- 9/23- repeat in 3 years  Pap- S/p Hysterectomy  Dexa- never  9/23:  The 10-year ASCVD risk score (Cory DK, et al., 2019) is: 2%    Values used to calculate the score:      Age: 63 years      Sex: Female      Is Non- : No      Diabetic: No      Tobacco smoker: No      Systolic Blood Pressure: 100 mmHg      Is BP treated: No      HDL Cholesterol: 79 mg/dL      Total Cholesterol: 226 mg/dL    Immunization History   Administered Date(s) Administered    COVID-19 Bivalent 12+ (Pfizer) 10/17/2022    COVID-19 MONOVALENT 12+ (Pfizer) 04/22/2021, 05/13/2021    Flu, Unspecified 08/30/2018    Influenza Vaccine 18-64 (Flublok) 12/10/2020, 10/17/2022    Influenza Vaccine >6 months,quad, PF 08/30/2018    Pneumococcal 20 valent Conjugate (Prevnar 20) 09/27/2023    Pneumococcal 23 valent 12/10/2020    TDAP  (Adacel,Boostrix) 05/30/2012, 10/17/2022    Td (Adult), Adsorbed 1962    Td,adult,historic,unspecified 1962     Immunization status: due today.     Current Outpatient Medications   Medication Sig Dispense Refill    albuterol (PROAIR HFA/PROVENTIL HFA/VENTOLIN HFA) 108 (90 Base) MCG/ACT inhaler Inhale 2 puffs into the lungs every 6 hours as needed for shortness of breath or wheezing 8.5 g 0    buPROPion (WELLBUTRIN XL) 150 MG 24 hr tablet TAKE 1 TABLET DAILY IN ADDITION  MG FOR TOTAL DAILY DOSE  MG 90 tablet 3    buPROPion (WELLBUTRIN XL) 300 MG 24 hr tablet TAKE 1 TABLET DAILY IN THE MORNING, IN ADDITION  MG FOR A TOTAL DAILY DOSE  MG 90 tablet 3    fluticasone (FLONASE) 50 MCG/ACT nasal spray USE 2 SPRAYS IN EACH NOSTRIL DAILY 16 g 0    fluticasone-salmeterol (ADVAIR) 500-50 MCG/ACT inhaler Inhale 1 puff into the lungs every 12 hours 60 each 2    montelukast (SINGULAIR) 10 MG tablet TAKE 1 TABLET AT BEDTIME 90 tablet 0     Past Medical History:   Diagnosis Date    Arthritis Sept 2020    Asthma 2019?    Depression 1995 or so     Past Surgical History:   Procedure Laterality Date    HYSTERECTOMY      OOPHORECTOMY      [place] LAP,DIAGNOSTIC ABDOMEN      Description: Laparoscopy (Diagnostic);  Recorded: 05/30/2012;    [facility] TOTAL ABDOM HYSTERECTOMY      Description: Total Abdominal Hysterectomy;  Recorded: 02/11/2013;     Patient has no known allergies.  Family History   Problem Relation Age of Onset    Vision Loss Mother     Diabetes Mother     Arthritis Brother     Hypertension Brother     Cancer Maternal Uncle         eye cancer     Social History     Socioeconomic History    Marital status:      Spouse name: Not on file    Number of children: Not on file    Years of education: Not on file    Highest education level: Not on file   Occupational History    Not on file   Tobacco Use    Smoking status: Former     Current packs/day: 0.00     Average packs/day: 0.5 packs/day for 7.0  years (3.5 ttl pk-yrs)     Types: Cigarettes     Start date: 1979     Quit date: 1986     Years since quittin.1    Smokeless tobacco: Never   Substance and Sexual Activity    Alcohol use: Yes     Alcohol/week: 3.0 standard drinks of alcohol    Drug use: No    Sexual activity: Not Currently     Partners: Male     Birth control/protection: Surgical   Other Topics Concern    Not on file   Social History Narrative    Not on file     Social Determinants of Health     Financial Resource Strain: Low Risk  (2024)    Financial Resource Strain     Within the past 12 months, have you or your family members you live with been unable to get utilities (heat, electricity) when it was really needed?: No   Food Insecurity: Low Risk  (2024)    Food Insecurity     Within the past 12 months, did you worry that your food would run out before you got money to buy more?: No     Within the past 12 months, did the food you bought just not last and you didn t have money to get more?: No   Transportation Needs: Low Risk  (2024)    Transportation Needs     Within the past 12 months, has lack of transportation kept you from medical appointments, getting your medicines, non-medical meetings or appointments, work, or from getting things that you need?: No   Physical Activity: Insufficiently Active (2024)    Exercise Vital Sign     Days of Exercise per Week: 4 days     Minutes of Exercise per Session: 30 min   Stress: Stress Concern Present (2024)    [employer] [place] [employer] - Occupational Stress Questionnaire     Feeling of Stress : Rather much   Social Connections: Unknown (2024)    Social Connection and Isolation Panel [NHANES]     Frequency of Communication with Friends and Family: Not on file     Frequency of Social Gatherings with Friends and Family: More than three times a week     Attends Hinduism Services: Not on file     Active Member of Clubs or Organizations: Not on file      "Attends Club or Organization Meetings: Not on file     Marital Status: Not on file   Interpersonal Safety: Low Risk  (9/30/2024)    Interpersonal Safety     Do you feel physically and emotionally safe where you currently live?: Yes     Within the past 12 months, have you been hit, slapped, kicked or otherwise physically hurt by someone?: No     Within the past 12 months, have you been humiliated or emotionally abused in other ways by your partner or ex-partner?: No   Housing Stability: Low Risk  (9/29/2024)    Housing Stability     Do you have housing? : Yes     Are you worried about losing your housing?: No       Review of Systems  Complete ROS negative except as noted in the HPI    Objective:      Vitals:    09/30/24 0856   BP: 120/80   Pulse: 75   Resp: 21   Temp: 97.8  F (36.6  C)   SpO2: 96%   Weight: 95.3 kg (210 lb)   Height: 1.625 m (5' 3.96\")       General appearance: Alert, cooperative, no distress, appears stated age, overweight  Head: Normocephalic, atraumatic, without obvious abnormality  EARS: TM's gray dull with structures seen bilaterally  Eyes: Pupils equal round, reactive.  Conjunctiva clear.  Nose: Nares normal, no drainage.  Throat: Lips, mucosa, tongue normal mucosa pink and moist  Neck: Supple, symmetric, trachea midline, no adenopathy.  No thyroid enlargement, tenderness or nodules.    Lungs: Clear to auscultation bilaterally, no wheezing or crackles present.  Respirations unlabored  Heart: Regular rate and rhythm, normal S1 and S2, no murmur, rub or gallop.  Abdomen: Soft, nontender, nondistended.  Bowel sounds active in all 4 quadrants.  No masses or organomegaly.  Extremities: Extremities normal, atraumatic.  No cyanosis or edema.  Skin: Skin color, texture, turgor normal no rashes or lesions on limited skin exam  Neurologic: CN II through XII intact, normal strength.      Vladimir Garcia MD    "

## 2024-10-01 PROBLEM — N18.31 STAGE 3A CHRONIC KIDNEY DISEASE (H): Status: ACTIVE | Noted: 2024-10-01

## 2024-10-01 NOTE — RESULT ENCOUNTER NOTE
Romy Beltran  Your results from your recent clinic visit show:  Your kidneys were mildly abnormal (creatinine).This is similar to last year.  Lets watch this for now. Your liver and electrolytes were normal.  Your lipids look ok and I used these as well as other factors from your history to calculate your 10 year risk of having something like a heart attack (ASCVD risk) and it was low risk. Just continue to work on exercise and diet to maintain this low risk.    The 10-year ASCVD risk score (Cory MONTERO, et al., 2019) is: 2.9%    Values used to calculate the score:      Age: 64 years      Sex: Female      Is Non- : No      Diabetic: No      Tobacco smoker: No      Systolic Blood Pressure: 120 mmHg      Is BP treated: No      HDL Cholesterol: 91 mg/dL      Total Cholesterol: 226 mg/dL    Your hemoglobin A1c was normal  Your CBC is normal with no anemia or signs of infection seen    If you have more questions please call the clinic at [phone] or send me a [facility] message    Dr. Vladimir Horton

## 2024-10-27 DIAGNOSIS — J45.30 MILD PERSISTENT ASTHMA WITHOUT COMPLICATION: ICD-10-CM

## 2024-10-28 RX ORDER — FLUTICASONE PROPIONATE AND SALMETEROL 500; 50 UG/1; UG/1
POWDER RESPIRATORY (INHALATION)
Refills: 3 | OUTPATIENT
Start: 2024-10-28

## 2024-12-11 DIAGNOSIS — J45.30 MILD PERSISTENT ASTHMA WITHOUT COMPLICATION: ICD-10-CM

## 2024-12-12 RX ORDER — MONTELUKAST SODIUM 10 MG/1
1 TABLET ORAL AT BEDTIME
Qty: 90 TABLET | Refills: 0 | Status: SHIPPED | OUTPATIENT
Start: 2024-12-12

## 2025-01-27 DIAGNOSIS — J45.30 MILD PERSISTENT ASTHMA WITHOUT COMPLICATION: ICD-10-CM

## 2025-01-28 RX ORDER — ALBUTEROL SULFATE 90 UG/1
INHALANT RESPIRATORY (INHALATION)
Qty: 8.5 G | Refills: 1 | Status: SHIPPED | OUTPATIENT
Start: 2025-01-28

## 2025-02-11 DIAGNOSIS — J45.30 MILD PERSISTENT ASTHMA WITHOUT COMPLICATION: ICD-10-CM

## 2025-02-11 RX ORDER — FLUTICASONE PROPIONATE AND SALMETEROL 500; 50 UG/1; UG/1
1 POWDER RESPIRATORY (INHALATION) EVERY 12 HOURS
Qty: 60 EACH | Refills: 0 | Status: SHIPPED | OUTPATIENT
Start: 2025-02-11

## 2025-02-11 NOTE — TELEPHONE ENCOUNTER
Incoming call from patient. She is completely out of her Advair inhaler. She states it could take 1-2 weeks to get a prescription from [facility]. Advised that it would be best to fill a 1 month supply at a local pharmacy for now. In 1-2 weeks she will send a [employer] message asking for a larger refill through [facility]. Patient is agreeable to plan of care.     Please call patient once prescription has been sent in. She is currently in [place].

## 2025-03-03 DIAGNOSIS — J45.30 MILD PERSISTENT ASTHMA WITHOUT COMPLICATION: ICD-10-CM

## 2025-03-04 RX ORDER — FLUTICASONE PROPIONATE AND SALMETEROL 500; 50 UG/1; UG/1
1 POWDER RESPIRATORY (INHALATION) EVERY 12 HOURS
Qty: 60 EACH | Refills: 11 | Status: SHIPPED | OUTPATIENT
Start: 2025-03-04

## 2025-03-11 DIAGNOSIS — J45.30 MILD PERSISTENT ASTHMA WITHOUT COMPLICATION: ICD-10-CM

## 2025-03-12 RX ORDER — MONTELUKAST SODIUM 10 MG/1
1 TABLET ORAL AT BEDTIME
Qty: 90 TABLET | Refills: 1 | Status: SHIPPED | OUTPATIENT
Start: 2025-03-12

## 2025-08-12 ENCOUNTER — PATIENT OUTREACH (OUTPATIENT)
Dept: CARE COORDINATION | Facility: CLINIC | Age: 65
End: 2025-08-12
Payer: COMMERCIAL

## 2025-09-01 ENCOUNTER — PATIENT OUTREACH (OUTPATIENT)
Dept: CARE COORDINATION | Facility: CLINIC | Age: 65
End: 2025-09-01
Payer: COMMERCIAL